# Patient Record
Sex: MALE | Race: WHITE | NOT HISPANIC OR LATINO | Employment: FULL TIME | URBAN - METROPOLITAN AREA
[De-identification: names, ages, dates, MRNs, and addresses within clinical notes are randomized per-mention and may not be internally consistent; named-entity substitution may affect disease eponyms.]

---

## 2023-08-24 PROBLEM — C61 MALIGNANT TUMOR OF PROSTATE (MULTI): Status: ACTIVE | Noted: 2023-08-24

## 2023-08-24 PROBLEM — N52.9 ERECTILE DYSFUNCTION: Status: ACTIVE | Noted: 2023-08-24

## 2023-08-24 PROBLEM — E78.00 PURE HYPERCHOLESTEROLEMIA: Status: ACTIVE | Noted: 2023-08-24

## 2023-08-24 PROBLEM — I10 HYPERTENSION: Status: ACTIVE | Noted: 2023-08-24

## 2023-08-24 PROBLEM — E11.9 TYPE 2 DIABETES MELLITUS (MULTI): Status: ACTIVE | Noted: 2023-08-24

## 2023-08-24 PROBLEM — R80.9 PROTEINURIA: Status: ACTIVE | Noted: 2023-08-24

## 2023-08-24 RX ORDER — ATORVASTATIN CALCIUM 40 MG/1
40 TABLET, FILM COATED ORAL DAILY
COMMUNITY
Start: 2013-03-18 | End: 2024-02-22 | Stop reason: SDUPTHER

## 2023-08-24 RX ORDER — LOSARTAN POTASSIUM 25 MG/1
25 TABLET ORAL DAILY
COMMUNITY
End: 2023-10-30 | Stop reason: WASHOUT

## 2023-08-24 RX ORDER — SEMAGLUTIDE 1.34 MG/ML
1 INJECTION, SOLUTION SUBCUTANEOUS
COMMUNITY
Start: 2023-03-16 | End: 2023-10-20 | Stop reason: SDUPTHER

## 2023-08-24 RX ORDER — GLIPIZIDE 10 MG/1
10 TABLET ORAL DAILY
COMMUNITY
End: 2023-12-04 | Stop reason: SDUPTHER

## 2023-08-24 RX ORDER — SEMAGLUTIDE 1.34 MG/ML
0.5 INJECTION, SOLUTION SUBCUTANEOUS
COMMUNITY
Start: 2022-12-09 | End: 2023-10-20 | Stop reason: SDUPTHER

## 2023-08-24 RX ORDER — METFORMIN HYDROCHLORIDE 1000 MG/1
1000 TABLET ORAL 2 TIMES DAILY
COMMUNITY
Start: 2023-04-20 | End: 2023-10-30

## 2023-08-24 RX ORDER — PIOGLITAZONEHYDROCHLORIDE 30 MG/1
30 TABLET ORAL DAILY
COMMUNITY
Start: 2013-05-17 | End: 2023-10-02

## 2023-08-24 RX ORDER — BLOOD SUGAR DIAGNOSTIC
STRIP MISCELLANEOUS
COMMUNITY
Start: 2023-03-16

## 2023-10-16 ENCOUNTER — LAB (OUTPATIENT)
Dept: LAB | Facility: LAB | Age: 63
End: 2023-10-16
Payer: COMMERCIAL

## 2023-10-16 DIAGNOSIS — C61 MALIGNANT NEOPLASM OF PROSTATE (MULTI): Primary | ICD-10-CM

## 2023-10-16 PROCEDURE — 84153 ASSAY OF PSA TOTAL: CPT

## 2023-10-16 PROCEDURE — 84402 ASSAY OF FREE TESTOSTERONE: CPT

## 2023-10-16 PROCEDURE — 36415 COLL VENOUS BLD VENIPUNCTURE: CPT

## 2023-10-16 PROCEDURE — 84154 ASSAY OF PSA FREE: CPT

## 2023-10-19 LAB
PSA FREE MFR SERPL: <50 %
PSA FREE SERPL-MCNC: <0.1 NG/ML
PSA SERPL IA-MCNC: 0.2 NG/ML (ref 0–4)

## 2023-10-20 ENCOUNTER — OFFICE VISIT (OUTPATIENT)
Dept: ENDOCRINOLOGY | Facility: CLINIC | Age: 63
End: 2023-10-20
Payer: COMMERCIAL

## 2023-10-20 ENCOUNTER — PHARMACY VISIT (OUTPATIENT)
Dept: PHARMACY | Facility: CLINIC | Age: 63
End: 2023-10-20
Payer: MEDICARE

## 2023-10-20 VITALS
DIASTOLIC BLOOD PRESSURE: 85 MMHG | WEIGHT: 222 LBS | HEART RATE: 67 BPM | BODY MASS INDEX: 29.69 KG/M2 | SYSTOLIC BLOOD PRESSURE: 134 MMHG

## 2023-10-20 DIAGNOSIS — I10 PRIMARY HYPERTENSION: ICD-10-CM

## 2023-10-20 DIAGNOSIS — E78.00 PURE HYPERCHOLESTEROLEMIA: ICD-10-CM

## 2023-10-20 DIAGNOSIS — E11.9 DIABETES MELLITUS, STABLE (MULTI): Primary | ICD-10-CM

## 2023-10-20 LAB
POC HEMOGLOBIN A1C: 7 % (ref 4.2–6.5)
TESTOSTERONE FREE (CHAN): 43 PG/ML (ref 35–155)
TESTOSTERONE,TOTAL,LC-MS/MS: 361 NG/DL (ref 250–1100)

## 2023-10-20 PROCEDURE — 83036 HEMOGLOBIN GLYCOSYLATED A1C: CPT | Performed by: NURSE PRACTITIONER

## 2023-10-20 PROCEDURE — RXMED WILLOW AMBULATORY MEDICATION CHARGE

## 2023-10-20 PROCEDURE — 3079F DIAST BP 80-89 MM HG: CPT | Performed by: NURSE PRACTITIONER

## 2023-10-20 PROCEDURE — 99213 OFFICE O/P EST LOW 20 MIN: CPT | Performed by: NURSE PRACTITIONER

## 2023-10-20 PROCEDURE — 3075F SYST BP GE 130 - 139MM HG: CPT | Performed by: NURSE PRACTITIONER

## 2023-10-20 PROCEDURE — 3051F HG A1C>EQUAL 7.0%<8.0%: CPT | Performed by: NURSE PRACTITIONER

## 2023-10-20 PROCEDURE — 4010F ACE/ARB THERAPY RXD/TAKEN: CPT | Performed by: NURSE PRACTITIONER

## 2023-10-20 RX ORDER — SEMAGLUTIDE 1.34 MG/ML
1 INJECTION, SOLUTION SUBCUTANEOUS
Qty: 3 ML | Refills: 11 | Status: SHIPPED | OUTPATIENT
Start: 2023-10-20 | End: 2024-09-20

## 2023-10-20 ASSESSMENT — PATIENT HEALTH QUESTIONNAIRE - PHQ9
SUM OF ALL RESPONSES TO PHQ9 QUESTIONS 1 & 2: 0
2. FEELING DOWN, DEPRESSED OR HOPELESS: NOT AT ALL
1. LITTLE INTEREST OR PLEASURE IN DOING THINGS: NOT AT ALL

## 2023-10-20 ASSESSMENT — LIFESTYLE VARIABLES: HOW OFTEN DO YOU HAVE A DRINK CONTAINING ALCOHOL: NEVER

## 2023-10-20 ASSESSMENT — PAIN SCALES - GENERAL: PAINLEVEL: 0-NO PAIN

## 2023-10-20 NOTE — PROGRESS NOTES
Chief Complaint   Patient presents with    Diabetes     Type 2  diabetes         HPI:    64 yo with DX DM Type 2 diagnosed age 53 yo. History HTN Dyslipidemia, Prostate cancer. seed implants 2020. Last A1c 7.7%, recently 7.2%. Patient testing sugars 1-2 times/day, no lows, am sugars waking 100, bedtimes can be <100. Following a carb controlled diet and knows reasonable carb allowances. Patient able to afford their medications. Patient is exercising, plays basketball weekly, 5K runs periodically           -Metformin Max dose, Ozempic 1 mg, hong 30mg, glipizide 10mg, no sglt2-I  -HTN Cozaar 25mg daily at goal  -STATIN Atorvastatin 40 mg daily LDL 89      Current Outpatient Medications:     atorvastatin (Lipitor) 40 mg tablet, Take 1 tablet (40 mg) by mouth once daily., Disp: , Rfl:     glipiZIDE (Glucotrol) 10 mg tablet, Take 1 tablet (10 mg) by mouth once daily., Disp: , Rfl:     losartan (Cozaar) 25 mg tablet, Take 1 tablet (25 mg) by mouth once daily., Disp: , Rfl:     metFORMIN (Glucophage) 1,000 mg tablet, Take 1 tablet (1,000 mg) by mouth 2 times a day., Disp: , Rfl:     OneTouch Ultra Test strip, , Disp: , Rfl:     Ozempic 1 mg/dose (4 mg/3 mL) pen injector, Inject 1 mg under the skin 1 (one) time per week., Disp: , Rfl:     pioglitazone (Actos) 30 mg tablet, TAKE 1 TABLET ONCE DAILY, Disp: 90 tablet, Rfl: 0    empagliflozin (Jardiance) 25 mg, Take 1 tablet (25 mg) by mouth once daily., Disp: , Rfl:     Ozempic 0.25 mg or 0.5 mg(2 mg/1.5 mL) pen injector, Inject 0.5 mg under the skin 1 (one) time per week., Disp: , Rfl:     semaglutide (OZEMPIC) 1 mg/dose (4 mg/3 mL) pen injector, INJECT 1MG UNDER THE SKIN WEEKLY, Disp: 9 mL, Rfl: 1    /85 (BP Location: Left arm, Patient Position: Sitting)   Pulse 67   Wt 101 kg (222 lb)   BMI 29.69 kg/m²      Past Medical History:   Diagnosis Date    Hyperlipidemia     Hypertension     Type 2 diabetes mellitus (CMS/HCC)         ROS:      CARDIOLOGY:           Lightheadedness denies.  Chest pain denies.  Leg edema denies.  Palpitations denies.         RESPIRATORY:          Cough denies.  Shortness of breath denies.  Wheezing denies.         GASTROENTEROLOGY:          Abdominal pain denies.  Constipation denies.  Diarrhea denies.  Heartburn denies.         ENDOCRINOLOGY:          Cold intolerance denies.  Excessive sweating denies.  Heat intolerance denies.  Tremulousness denies.         NEUROLOGY:          Burning pain in feet denies.  Burning pain in hands denies.         PSYCHOLOGY:          Low energy denies.  Irritability denies.  Sleep disturbances denies.           Examination:     General Examination:         GENERAL APPEARANCE:  Pleasant and cooperative,No Acute Distress.          NECK: no lymphadenopathy,no thyromegaly, no dominant thyroid nodules.          HEART: no murmurs, click or rubs, regular rate and rhythm, normal S1S2.          LUNGS: clear to auscultation bilaterally, no wheezes/rhonchi/rales.          EXTREMITIES: no edema, 2+ pulses               Chemistry    Lab Results   Component Value Date/Time     07/10/2023 1559    K 4.2 07/10/2023 1559     07/10/2023 1559    CO2 24 07/10/2023 1559    BUN 15 07/10/2023 1559    CREATININE 1.0 07/10/2023 1559    Lab Results   Component Value Date/Time    CALCIUM 9.0 07/10/2023 1559    ALKPHOS 49 07/10/2023 1559    AST 19 07/10/2023 1559    ALT 15 07/10/2023 1559    BILITOT 0.3 07/10/2023 1559          Lab Results   Component Value Date    WBC 7.5 03/23/2022    HGB 14.7 03/23/2022    HCT 43.8 03/23/2022    MCV 86.6 03/23/2022     03/23/2022     Lab Results   Component Value Date    HGBA1C 7.0 (A) 10/20/2023       1. Diabetes mellitus, stable (CMS/[facility])  -excellent reduction of A1c  -no changes in treatment plan    - POCT glycosylated hemoglobin (Hb A1C) manually resulted    2. Primary hypertension  -stable    3. Pure hypercholesterolemia  -LDL target < 70  -tolerates statin      Follow up CNP 4  months      -previous labs and notes reviewed  -labs/tests mentioned in above note reviewed and interpreted  -reviewed and counseled on medication monitoring and side effects.

## 2023-12-04 DIAGNOSIS — E11.9 TYPE 2 DIABETES MELLITUS WITHOUT COMPLICATION, WITHOUT LONG-TERM CURRENT USE OF INSULIN (MULTI): Primary | ICD-10-CM

## 2023-12-04 RX ORDER — GLIPIZIDE 10 MG/1
10 TABLET ORAL DAILY
Qty: 90 TABLET | Refills: 0 | Status: SHIPPED | OUTPATIENT
Start: 2023-12-04 | End: 2024-03-07 | Stop reason: SDUPTHER

## 2023-12-04 NOTE — TELEPHONE ENCOUNTER
Spouse requesting refill of Glipizide 10 mg to go to [facility]. LOV 7/11/2023. NOV 1/16/2024

## 2023-12-08 PROCEDURE — RXMED WILLOW AMBULATORY MEDICATION CHARGE

## 2023-12-20 DIAGNOSIS — E11.9 TYPE 2 DIABETES MELLITUS WITHOUT COMPLICATION, WITHOUT LONG-TERM CURRENT USE OF INSULIN (MULTI): ICD-10-CM

## 2023-12-20 RX ORDER — METFORMIN HYDROCHLORIDE 1000 MG/1
1000 TABLET ORAL
Qty: 180 TABLET | Refills: 0 | Status: SHIPPED | OUTPATIENT
Start: 2023-12-20 | End: 2024-03-29 | Stop reason: SDUPTHER

## 2023-12-20 NOTE — TELEPHONE ENCOUNTER
Switch to mail order per ins.  LV 7/11/23, NV 1/16/24.    Request for metformin 1000 mg to [facility].

## 2024-01-02 DIAGNOSIS — E11.9 TYPE 2 DIABETES MELLITUS WITHOUT COMPLICATION, WITHOUT LONG-TERM CURRENT USE OF INSULIN (MULTI): ICD-10-CM

## 2024-01-02 RX ORDER — PIOGLITAZONEHYDROCHLORIDE 30 MG/1
30 TABLET ORAL DAILY
Qty: 90 TABLET | Refills: 0 | Status: SHIPPED | OUTPATIENT
Start: 2024-01-02 | End: 2024-03-29 | Stop reason: SDUPTHER

## 2024-01-02 NOTE — TELEPHONE ENCOUNTER
LV 7/11/23, NV 1/16/24.  Request for pioglitazone 30 mg to [facility].

## 2024-01-04 ENCOUNTER — PHARMACY VISIT (OUTPATIENT)
Dept: PHARMACY | Facility: CLINIC | Age: 64
End: 2024-01-04
Payer: MEDICARE

## 2024-01-16 ENCOUNTER — OFFICE VISIT (OUTPATIENT)
Dept: PRIMARY CARE | Facility: CLINIC | Age: 64
End: 2024-01-16
Payer: COMMERCIAL

## 2024-01-16 VITALS
SYSTOLIC BLOOD PRESSURE: 130 MMHG | BODY MASS INDEX: 30.23 KG/M2 | HEART RATE: 80 BPM | OXYGEN SATURATION: 98 % | TEMPERATURE: 96.8 F | WEIGHT: 226 LBS | DIASTOLIC BLOOD PRESSURE: 68 MMHG

## 2024-01-16 DIAGNOSIS — I10 PRIMARY HYPERTENSION: Primary | ICD-10-CM

## 2024-01-16 DIAGNOSIS — E11.9 DIABETES MELLITUS, STABLE (MULTI): ICD-10-CM

## 2024-01-16 DIAGNOSIS — C61 MALIGNANT TUMOR OF PROSTATE (MULTI): ICD-10-CM

## 2024-01-16 DIAGNOSIS — E78.00 PURE HYPERCHOLESTEROLEMIA: ICD-10-CM

## 2024-01-16 PROCEDURE — 3075F SYST BP GE 130 - 139MM HG: CPT | Performed by: INTERNAL MEDICINE

## 2024-01-16 PROCEDURE — 3078F DIAST BP <80 MM HG: CPT | Performed by: INTERNAL MEDICINE

## 2024-01-16 PROCEDURE — 99396 PREV VISIT EST AGE 40-64: CPT | Performed by: INTERNAL MEDICINE

## 2024-01-16 PROCEDURE — 4010F ACE/ARB THERAPY RXD/TAKEN: CPT | Performed by: INTERNAL MEDICINE

## 2024-01-16 ASSESSMENT — PAIN SCALES - GENERAL: PAINLEVEL: 0-NO PAIN

## 2024-01-16 ASSESSMENT — ENCOUNTER SYMPTOMS
DEPRESSION: 0
OCCASIONAL FEELINGS OF UNSTEADINESS: 0
LOSS OF SENSATION IN FEET: 0

## 2024-01-16 ASSESSMENT — PATIENT HEALTH QUESTIONNAIRE - PHQ9
1. LITTLE INTEREST OR PLEASURE IN DOING THINGS: NOT AT ALL
SUM OF ALL RESPONSES TO PHQ9 QUESTIONS 1 AND 2: 0
2. FEELING DOWN, DEPRESSED OR HOPELESS: NOT AT ALL

## 2024-01-16 NOTE — PROGRESS NOTES
[facility]: [facility]  PROGRESS NOTE      Jasbir Raza is a 63 y.o. male that is being seen  today for Annual Exam.  Subjective   Pt. Is being seen for annual physical exam.Pt. has been doing well.Advance directives discussed with patient   Denies any hospitalisation or urgent care visit.  Previous Labs reviewed .  Pt. Is upto date on screening exams and vaccination  Denies any falls or hospitalisation.      patient is a 63-year-old male with history of diabetes, hypertension hyperlipidemia who is being seen for annual physical examination.  Patient follows up with endocrinologist and his blood sugars have been fairly controlled on current medications.  Patient's last hemoglobin A1c was 7.0.  Patient's previous labs done was reviewed and have been stable.  Patient denies any complaints with the medications.  Patient is up-to-date on immunizations.  Patient is up-to-date on colonoscopy.  Patient does follow-up with the urologist for prostate cancer and has been in remission.      ROS  Negative for fever or chills  Negative for sore throat, ear pain, nasal discharge  Negative for cough, shortness of breath or wheezing  Negative for chest pain, palpitations, swelling of legs  Negative for abdominal pain, constipation, diarrhea, blood in the stools  Negative for urinary complaints  Negative for headache, dizziness, weakness or numbness  Negative for joint pain  Negative for depression or anxiety  All other systems reviewed and were negative   Vitals:    01/16/24 1419   BP: 130/68   Pulse: 80   Temp: 36 °C (96.8 °F)   SpO2: 98%      Vitals:    01/16/24 1419   Weight: 103 kg (226 lb)     Body mass index is 30.23 kg/m².  Physical Exam  Constitutional: Patient does not appear to be in any acute distress  Head and Face: NCAT  Eyes: Normal external exam, EOMI  ENT: Normal external inspection of ears and nose. Oropharynx normal.  Cardiovascular: RRR, S1/S2, no murmurs, rubs, or gallops, radial pulses  +2, no edema of extremities  Pulmonary: CTAB, no respiratory distress.  Abdomen: +BS, soft, non-tender, nondistended, no guarding or rebound, no masses noted  MSK: No joint swelling, normal movements of all extremities. Range of motion- normal.  Skin- No lesions, contusions, or erythema.  Peripheral puslses palpable bilaterally 2+  Neuro: AAO X3, Cranial nerves 2-12 grossly intact,DTR 2+ in all 4 limbs   Psychiatric: Judgment intact. Appropriate mood and behavior    LABS   [unfilled]  Lab Results   Component Value Date    GLUCOSE 145 (H) 07/10/2023    CALCIUM 9.0 07/10/2023     07/10/2023    K 4.2 07/10/2023    CO2 24 07/10/2023     07/10/2023    BUN 15 07/10/2023    CREATININE 1.0 07/10/2023     Lab Results   Component Value Date    ALT 15 07/10/2023    AST 19 07/10/2023    ALKPHOS 49 07/10/2023    BILITOT 0.3 07/10/2023     Lab Results   Component Value Date    WBC 7.5 03/23/2022    HGB 14.7 03/23/2022    HCT 43.8 03/23/2022    MCV 86.6 03/23/2022     03/23/2022     Lab Results   Component Value Date    CHOL 155 03/03/2023    CHOL 162 03/23/2022    CHOL 167 11/22/2021     Lab Results   Component Value Date    HDL 47 03/03/2023    HDL 55 03/23/2022    HDL 53 11/22/2021     Lab Results   Component Value Date    LDLCALC 80 03/03/2023    LDLCALC 89 03/23/2022    LDLCALC 92 11/22/2021     Lab Results   Component Value Date    TRIG 139 03/03/2023    TRIG 91 03/23/2022    TRIG 110 11/22/2021     Lab Results   Component Value Date    HGBA1C 7.0 (A) 10/20/2023     Other labs not included in the list above were reviewed either before or during this encounter.    History    Past Medical History:   Diagnosis Date    ED (erectile dysfunction)     Hypercholesteremia     Hyperlipidemia     Hypertension     Prostate cancer (CMS/HCC)     Proteinuria     Type 2 diabetes mellitus (CMS/HCC)      Past Surgical History:   Procedure Laterality Date    COLONOSCOPY      PROSTATE SURGERY      prostate seeds     Family  History   Problem Relation Name Age of Onset    Stroke Father       No Known Allergies  Current Outpatient Medications on File Prior to Visit   Medication Sig Dispense Refill    atorvastatin (Lipitor) 40 mg tablet Take 1 tablet (40 mg) by mouth once daily.      glipiZIDE (Glucotrol) 10 mg tablet Take 1 tablet (10 mg) by mouth once daily. 90 tablet 0    losartan (Cozaar) 25 mg tablet Take 1 tablet (25 mg) by mouth once daily.      metFORMIN (Glucophage) 1,000 mg tablet Take 1 tablet (1,000 mg) by mouth 2 times a day with meals. 180 tablet 0    OneTouch Ultra Test strip       Ozempic 1 mg/dose (4 mg/3 mL) pen injector Inject 1 mg under the skin 1 (one) time per week. 3 mL 11    pioglitazone (Actos) 30 mg tablet Take 1 tablet (30 mg) by mouth once daily. 90 tablet 0     No current facility-administered medications on file prior to visit.     Immunization History   Administered Date(s) Administered    Flu vaccine, quadrivalent, high-dose, preservative free, age 65y+ (FLUZONE) 09/15/2023    Flu vaccine, quadrivalent, recombinant, preservative free, adult (FLUBLOK) 11/24/2021, 10/03/2022    Influenza, injectable, MDCK, quadrivalent 11/27/2020    Influenza, injectable, quadrivalent 10/12/2018, 11/01/2019    Moderna SARS-CoV-2 Vaccination 03/17/2021, 04/14/2021, 11/13/2021    Pfizer COVID-19 vaccine, Fall 2023, 12 years and older, (30mcg/0.3mL) 09/23/2023    Pneumococcal conjugate vaccine, 20-valent (PREVNAR 20) 09/15/2023     Patient's medical history was reviewed and updated either before or during this encounter.  ASSESSMENT / PLAN:  Diagnoses and all orders for this visit:  Primary hypertension  -     CBC; Future  -     Comprehensive Metabolic Panel; Future  Malignant tumor of prostate (CMS/HCC)  Diabetes mellitus, stable (CMS/HCC)  Pure hypercholesterolemia  -     Lipid Panel; Future      Patient's blood sugars have been fairly controlled.  Patient follows up with endocrinologist and no change in medication recently  patient has been on Ozempic, pioglitazone, metformin and glipizide  Patient is due for his cholesterol levels to be checked.  Patient follows up with his urologist for prostate cancer which has been in remission.  Last PSA level is  0.2      Sanjay Orellana MD

## 2024-01-20 PROCEDURE — RXMED WILLOW AMBULATORY MEDICATION CHARGE

## 2024-01-22 ENCOUNTER — LAB (OUTPATIENT)
Dept: LAB | Facility: LAB | Age: 64
End: 2024-01-22
Payer: COMMERCIAL

## 2024-01-22 DIAGNOSIS — E78.00 PURE HYPERCHOLESTEROLEMIA: ICD-10-CM

## 2024-01-22 DIAGNOSIS — I10 PRIMARY HYPERTENSION: ICD-10-CM

## 2024-01-22 LAB
ALBUMIN SERPL-MCNC: 4.4 G/DL (ref 3.5–5)
ALP BLD-CCNC: 55 U/L (ref 35–125)
ALT SERPL-CCNC: 11 U/L (ref 5–40)
ANION GAP SERPL CALC-SCNC: 10 MMOL/L
AST SERPL-CCNC: 12 U/L (ref 5–40)
BILIRUB SERPL-MCNC: 0.4 MG/DL (ref 0.1–1.2)
BUN SERPL-MCNC: 18 MG/DL (ref 8–25)
CALCIUM SERPL-MCNC: 9.5 MG/DL (ref 8.5–10.4)
CHLORIDE SERPL-SCNC: 100 MMOL/L (ref 97–107)
CHOLEST SERPL-MCNC: 157 MG/DL (ref 133–200)
CHOLEST/HDLC SERPL: 3.1 {RATIO}
CO2 SERPL-SCNC: 27 MMOL/L (ref 24–31)
CREAT SERPL-MCNC: 1 MG/DL (ref 0.4–1.6)
EGFRCR SERPLBLD CKD-EPI 2021: 86 ML/MIN/1.73M*2
ERYTHROCYTE [DISTWIDTH] IN BLOOD BY AUTOMATED COUNT: 14.1 % (ref 11.5–14.5)
GLUCOSE SERPL-MCNC: 166 MG/DL (ref 65–99)
HCT VFR BLD AUTO: 46.4 % (ref 41–52)
HDLC SERPL-MCNC: 51 MG/DL
HGB BLD-MCNC: 15.1 G/DL (ref 13.5–17.5)
LDLC SERPL CALC-MCNC: 87 MG/DL (ref 65–130)
MCH RBC QN AUTO: 29 PG (ref 26–34)
MCHC RBC AUTO-ENTMCNC: 32.5 G/DL (ref 32–36)
MCV RBC AUTO: 89 FL (ref 80–100)
NRBC BLD-RTO: 0 /100 WBCS (ref 0–0)
PLATELET # BLD AUTO: 201 X10*3/UL (ref 150–450)
POTASSIUM SERPL-SCNC: 4.4 MMOL/L (ref 3.4–5.1)
PROT SERPL-MCNC: 6.4 G/DL (ref 5.9–7.9)
RBC # BLD AUTO: 5.21 X10*6/UL (ref 4.5–5.9)
SODIUM SERPL-SCNC: 137 MMOL/L (ref 133–145)
TRIGL SERPL-MCNC: 93 MG/DL (ref 40–150)
WBC # BLD AUTO: 7.7 X10*3/UL (ref 4.4–11.3)

## 2024-01-22 PROCEDURE — 80061 LIPID PANEL: CPT

## 2024-01-22 PROCEDURE — 36415 COLL VENOUS BLD VENIPUNCTURE: CPT

## 2024-01-22 PROCEDURE — 80053 COMPREHEN METABOLIC PANEL: CPT

## 2024-01-22 PROCEDURE — 85027 COMPLETE CBC AUTOMATED: CPT

## 2024-01-23 ENCOUNTER — PHARMACY VISIT (OUTPATIENT)
Dept: PHARMACY | Facility: CLINIC | Age: 64
End: 2024-01-23
Payer: MEDICARE

## 2024-01-23 ENCOUNTER — TELEPHONE (OUTPATIENT)
Dept: PRIMARY CARE | Facility: CLINIC | Age: 64
End: 2024-01-23
Payer: COMMERCIAL

## 2024-01-23 NOTE — TELEPHONE ENCOUNTER
----- Message from Sanjay Orellana MD sent at 1/23/2024  1:42 PM EST -----  Labs are stable except elevated glucose.pt. is being seen by endocrinologist

## 2024-02-20 ENCOUNTER — OFFICE VISIT (OUTPATIENT)
Dept: ENDOCRINOLOGY | Facility: CLINIC | Age: 64
End: 2024-02-20
Payer: COMMERCIAL

## 2024-02-20 VITALS
SYSTOLIC BLOOD PRESSURE: 126 MMHG | BODY MASS INDEX: 30.1 KG/M2 | DIASTOLIC BLOOD PRESSURE: 66 MMHG | HEART RATE: 83 BPM | WEIGHT: 225 LBS

## 2024-02-20 DIAGNOSIS — E11.9 DIABETES MELLITUS, STABLE (MULTI): Primary | ICD-10-CM

## 2024-02-20 DIAGNOSIS — E78.00 PURE HYPERCHOLESTEROLEMIA: ICD-10-CM

## 2024-02-20 DIAGNOSIS — I10 PRIMARY HYPERTENSION: ICD-10-CM

## 2024-02-20 LAB — POC HEMOGLOBIN A1C: 7.1 % (ref 4.2–6.5)

## 2024-02-20 PROCEDURE — 3078F DIAST BP <80 MM HG: CPT | Performed by: NURSE PRACTITIONER

## 2024-02-20 PROCEDURE — 99213 OFFICE O/P EST LOW 20 MIN: CPT | Performed by: NURSE PRACTITIONER

## 2024-02-20 PROCEDURE — 83036 HEMOGLOBIN GLYCOSYLATED A1C: CPT | Performed by: NURSE PRACTITIONER

## 2024-02-20 PROCEDURE — 3048F LDL-C <100 MG/DL: CPT | Performed by: NURSE PRACTITIONER

## 2024-02-20 PROCEDURE — 3074F SYST BP LT 130 MM HG: CPT | Performed by: NURSE PRACTITIONER

## 2024-02-20 PROCEDURE — 4010F ACE/ARB THERAPY RXD/TAKEN: CPT | Performed by: NURSE PRACTITIONER

## 2024-02-20 ASSESSMENT — LIFESTYLE VARIABLES
HOW OFTEN DO YOU HAVE A DRINK CONTAINING ALCOHOL: 2-4 TIMES A MONTH
HOW MANY STANDARD DRINKS CONTAINING ALCOHOL DO YOU HAVE ON A TYPICAL DAY: 1 OR 2

## 2024-02-20 ASSESSMENT — PATIENT HEALTH QUESTIONNAIRE - PHQ9
1. LITTLE INTEREST OR PLEASURE IN DOING THINGS: NOT AT ALL
2. FEELING DOWN, DEPRESSED OR HOPELESS: NOT AT ALL
SUM OF ALL RESPONSES TO PHQ9 QUESTIONS 1 & 2: 0

## 2024-02-20 ASSESSMENT — PAIN SCALES - GENERAL: PAINLEVEL: 0-NO PAIN

## 2024-02-20 NOTE — PROGRESS NOTES
HPI     62 yo with DX DM Type 2 diagnosed age 53 yo. History HTN Dyslipidemia, Prostate cancer. seed implants 2020. A1c 7.1% was 7.2%. Patient testing sugars 1-2 times/day, no lows, am sugars waking 100, bedtimes can be <100. Following a carb controlled diet and knows reasonable carb allowances. Patient able to afford their medications. Patient is exercising, plays basketball weekly, 5K runs periodically           -Metformin Max dose, Ozempic 1 mg, hong 30mg, glipizide 10mg, no sglt2-I  -HTN Cozaar 25mg daily at goal  -STATIN Atorvastatin 40 mg daily LDLCALC 87      Current Outpatient Medications:     atorvastatin (Lipitor) 40 mg tablet, Take 1 tablet (40 mg) by mouth once daily., Disp: , Rfl:     glipiZIDE (Glucotrol) 10 mg tablet, Take 1 tablet (10 mg) by mouth once daily., Disp: 90 tablet, Rfl: 0    losartan (Cozaar) 25 mg tablet, Take 1 tablet (25 mg) by mouth once daily., Disp: , Rfl:     metFORMIN (Glucophage) 1,000 mg tablet, Take 1 tablet (1,000 mg) by mouth 2 times a day with meals., Disp: 180 tablet, Rfl: 0    OneTouch Ultra Test strip, , Disp: , Rfl:     Ozempic 1 mg/dose (4 mg/3 mL) pen injector, Inject 1 mg under the skin 1 (one) time per week., Disp: 3 mL, Rfl: 11    pioglitazone (Actos) 30 mg tablet, Take 1 tablet (30 mg) by mouth once daily., Disp: 90 tablet, Rfl: 0      Allergies as of 02/20/2024    (No Known Allergies)         Review of Systems   Cardiology: Lightheadedness-denies.  Chest pain-denies.  Leg edema-denies.  Palpitations-denies.  Respiratory: Cough-denies. Shortness of breath-denies.  Wheezing-denies.  Gastroenterology: Constipation-denies.  Diarrhea-denies.  Heartburn-denies.  Endocrinology: Cold intolerance-denies.  Heat intolerance-denies.  Sweats-denies.  Neurology: Headache-denies.  Tremor-denies.  Neuropathy in extremities-denies.  Psychology: Low energy-denies.  Irritability-denies.  Sleep disturbances-denies.      /66 (BP Location: Left arm, Patient Position: Sitting)    Pulse 83   Wt 102 kg (225 lb)   BMI 30.10 kg/m²       Labs:  Lab Results   Component Value Date    WBC 7.7 01/22/2024    NRBC 0.0 01/22/2024    RBC 5.21 01/22/2024    HGB 15.1 01/22/2024    HCT 46.4 01/22/2024     01/22/2024     Lab Results   Component Value Date    CALCIUM 9.5 01/22/2024    AST 12 01/22/2024    ALKPHOS 55 01/22/2024    BILITOT 0.4 01/22/2024    PROT 6.4 01/22/2024    ALBUMIN 4.4 01/22/2024    GLOB 2.0 07/10/2023    AGR 2.3 07/10/2023     01/22/2024    K 4.4 01/22/2024     01/22/2024    CO2 27 01/22/2024    ANIONGAP 10 01/22/2024    BUN 18 01/22/2024    CREATININE 1.00 01/22/2024    UREACREAUR 15.0 07/10/2023    GLUCOSE 166 (H) 01/22/2024    ALT 11 01/22/2024    EGFR 86 01/22/2024     Lab Results   Component Value Date    CHOL 157 01/22/2024    TRIG 93 01/22/2024    HDL 51.0 01/22/2024    LDLCALC 87 01/22/2024       Lab Results   Component Value Date    HGBA1C 7.1 (A) 02/20/2024         Physical Exam   General Appearance: pleasant, cooperative, no acute distress  HEENT: no chemosis, no proptosis, no lid lag, no lid retraction  Neck: no lymphadenopathy, no thyromegaly, no dominant thyroid nodules  Heart: no murmurs, regular rate and rhythm, S1 and S2  Lungs: no wheezes, no rhonci, no rales  Extremities: no lower extremity swelling      Assessment/Plan     1. Diabetes mellitus, stable (CMS/HCC)  -excellent A1c control  -no changes in treatment plan  -consider increasing GLP1 for weight     - POCT glycosylated hemoglobin (Hb A1C) manually resulted    2. Primary hypertension  -stable    3. Pure hypercholesterolemia  -at target     Follow Up: 4 months CNP      -labs/tests/notes reviewed  -reviewed and counseled patient on medication monitoring and side effects

## 2024-02-22 DIAGNOSIS — E78.00 PURE HYPERCHOLESTEROLEMIA: Primary | ICD-10-CM

## 2024-02-22 RX ORDER — ATORVASTATIN CALCIUM 40 MG/1
40 TABLET, FILM COATED ORAL DAILY
Qty: 90 TABLET | Refills: 1 | Status: SHIPPED | OUTPATIENT
Start: 2024-02-22

## 2024-02-22 NOTE — TELEPHONE ENCOUNTER
Refill     [facility]     Atorvastatin 40 mg     LV 01/16/24 NV 7/16/2024

## 2024-02-28 PROCEDURE — RXMED WILLOW AMBULATORY MEDICATION CHARGE

## 2024-02-29 ENCOUNTER — PHARMACY VISIT (OUTPATIENT)
Dept: PHARMACY | Facility: CLINIC | Age: 64
End: 2024-02-29
Payer: MEDICARE

## 2024-03-07 DIAGNOSIS — E11.9 TYPE 2 DIABETES MELLITUS WITHOUT COMPLICATION, WITHOUT LONG-TERM CURRENT USE OF INSULIN (MULTI): ICD-10-CM

## 2024-03-07 RX ORDER — GLIPIZIDE 10 MG/1
10 TABLET ORAL DAILY
Qty: 90 TABLET | Refills: 1 | Status: SHIPPED | OUTPATIENT
Start: 2024-03-07

## 2024-03-22 PROCEDURE — RXMED WILLOW AMBULATORY MEDICATION CHARGE

## 2024-03-26 ENCOUNTER — PHARMACY VISIT (OUTPATIENT)
Dept: PHARMACY | Facility: CLINIC | Age: 64
End: 2024-03-26
Payer: MEDICARE

## 2024-03-29 DIAGNOSIS — E11.9 TYPE 2 DIABETES MELLITUS WITHOUT COMPLICATION, WITHOUT LONG-TERM CURRENT USE OF INSULIN (MULTI): ICD-10-CM

## 2024-03-29 RX ORDER — PIOGLITAZONEHYDROCHLORIDE 30 MG/1
30 TABLET ORAL DAILY
Qty: 90 TABLET | Refills: 1 | Status: SHIPPED | OUTPATIENT
Start: 2024-03-29

## 2024-03-29 RX ORDER — METFORMIN HYDROCHLORIDE 1000 MG/1
1000 TABLET ORAL
Qty: 180 TABLET | Refills: 1 | Status: SHIPPED | OUTPATIENT
Start: 2024-03-29

## 2024-03-29 NOTE — TELEPHONE ENCOUNTER
Refill    [facility]     Metformin 1,000 mg  Pioglitazone 30 mg     LV 01/16/24 NV 7/16/2024  '

## 2024-04-12 ENCOUNTER — LAB (OUTPATIENT)
Dept: LAB | Facility: LAB | Age: 64
End: 2024-04-12
Payer: COMMERCIAL

## 2024-04-12 DIAGNOSIS — N18.4 CHRONIC KIDNEY DISEASE, STAGE 4 (SEVERE) (MULTI): ICD-10-CM

## 2024-04-12 DIAGNOSIS — C61 MALIGNANT NEOPLASM OF PROSTATE (MULTI): Primary | ICD-10-CM

## 2024-04-12 LAB — PSA SERPL-MCNC: 0.1 NG/ML

## 2024-04-12 PROCEDURE — 36415 COLL VENOUS BLD VENIPUNCTURE: CPT

## 2024-04-12 PROCEDURE — 84153 ASSAY OF PSA TOTAL: CPT

## 2024-04-19 PROCEDURE — RXMED WILLOW AMBULATORY MEDICATION CHARGE

## 2024-04-24 ENCOUNTER — PHARMACY VISIT (OUTPATIENT)
Dept: PHARMACY | Facility: CLINIC | Age: 64
End: 2024-04-24
Payer: MEDICARE

## 2024-05-17 PROCEDURE — RXMED WILLOW AMBULATORY MEDICATION CHARGE

## 2024-05-21 ENCOUNTER — PHARMACY VISIT (OUTPATIENT)
Dept: PHARMACY | Facility: CLINIC | Age: 64
End: 2024-05-21
Payer: MEDICARE

## 2024-06-18 PROCEDURE — RXMED WILLOW AMBULATORY MEDICATION CHARGE

## 2024-06-19 ENCOUNTER — PHARMACY VISIT (OUTPATIENT)
Dept: PHARMACY | Facility: CLINIC | Age: 64
End: 2024-06-19
Payer: MEDICARE

## 2024-06-21 ENCOUNTER — APPOINTMENT (OUTPATIENT)
Dept: ENDOCRINOLOGY | Facility: CLINIC | Age: 64
End: 2024-06-21
Payer: COMMERCIAL

## 2024-06-21 VITALS
HEART RATE: 83 BPM | SYSTOLIC BLOOD PRESSURE: 124 MMHG | DIASTOLIC BLOOD PRESSURE: 68 MMHG | HEIGHT: 73 IN | BODY MASS INDEX: 29.42 KG/M2 | WEIGHT: 222 LBS

## 2024-06-21 DIAGNOSIS — E78.00 PURE HYPERCHOLESTEROLEMIA: ICD-10-CM

## 2024-06-21 DIAGNOSIS — I10 PRIMARY HYPERTENSION: ICD-10-CM

## 2024-06-21 DIAGNOSIS — E11.9 DIABETES MELLITUS, STABLE (MULTI): Primary | ICD-10-CM

## 2024-06-21 LAB — POC HEMOGLOBIN A1C: 7 % (ref 4.2–6.5)

## 2024-06-21 PROCEDURE — 99213 OFFICE O/P EST LOW 20 MIN: CPT | Performed by: NURSE PRACTITIONER

## 2024-06-21 PROCEDURE — 3048F LDL-C <100 MG/DL: CPT | Performed by: NURSE PRACTITIONER

## 2024-06-21 PROCEDURE — 83036 HEMOGLOBIN GLYCOSYLATED A1C: CPT | Performed by: NURSE PRACTITIONER

## 2024-06-21 PROCEDURE — 4010F ACE/ARB THERAPY RXD/TAKEN: CPT | Performed by: NURSE PRACTITIONER

## 2024-06-21 PROCEDURE — 3074F SYST BP LT 130 MM HG: CPT | Performed by: NURSE PRACTITIONER

## 2024-06-21 PROCEDURE — 3078F DIAST BP <80 MM HG: CPT | Performed by: NURSE PRACTITIONER

## 2024-06-21 ASSESSMENT — ENCOUNTER SYMPTOMS
OCCASIONAL FEELINGS OF UNSTEADINESS: 0
LOSS OF SENSATION IN FEET: 0
DEPRESSION: 0

## 2024-06-21 ASSESSMENT — LIFESTYLE VARIABLES
SKIP TO QUESTIONS 9-10: 1
HOW MANY STANDARD DRINKS CONTAINING ALCOHOL DO YOU HAVE ON A TYPICAL DAY: PATIENT DOES NOT DRINK
AUDIT-C TOTAL SCORE: 0
HOW OFTEN DO YOU HAVE A DRINK CONTAINING ALCOHOL: NEVER
HOW OFTEN DO YOU HAVE SIX OR MORE DRINKS ON ONE OCCASION: NEVER

## 2024-06-21 ASSESSMENT — PAIN SCALES - GENERAL: PAINLEVEL: 0-NO PAIN

## 2024-06-21 ASSESSMENT — PATIENT HEALTH QUESTIONNAIRE - PHQ9
2. FEELING DOWN, DEPRESSED OR HOPELESS: NOT AT ALL
1. LITTLE INTEREST OR PLEASURE IN DOING THINGS: NOT AT ALL
SUM OF ALL RESPONSES TO PHQ9 QUESTIONS 1 & 2: 0

## 2024-06-21 NOTE — PROGRESS NOTES
"HPI     65 yo with DX DM Type 2 diagnosed age 53 yo. History HTN Dyslipidemia, Prostate cancer. seed implants 2020. A1c 7.0% was 7.1%. Patient testing sugars 1-2 times/day, no lows, am sugars waking 100, bedtimes can be <100. Following a carb controlled diet and knows reasonable carb allowances. Patient able to afford their medications. Patient is exercising, plays basketball weekly, 5K runs periodically           -Metformin Max dose, Ozempic 1 mg, hong 30mg, glipizide 10mg, no sglt2-I  -HTN Cozaar 25mg daily at goal  -STATIN Atorvastatin 40 mg daily LDLCALC 87      Current Outpatient Medications:     atorvastatin (Lipitor) 40 mg tablet, Take 1 tablet (40 mg) by mouth once daily., Disp: 90 tablet, Rfl: 1    glipiZIDE (Glucotrol) 10 mg tablet, Take 1 tablet (10 mg) by mouth once daily., Disp: 90 tablet, Rfl: 1    losartan (Cozaar) 25 mg tablet, Take 1 tablet (25 mg) by mouth once daily., Disp: , Rfl:     metFORMIN (Glucophage) 1,000 mg tablet, Take 1 tablet (1,000 mg) by mouth 2 times a day with meals., Disp: 180 tablet, Rfl: 1    OneTouch Ultra Test strip, , Disp: , Rfl:     Ozempic 1 mg/dose (4 mg/3 mL) pen injector, Inject 1 mg under the skin 1 (one) time per week., Disp: 3 mL, Rfl: 11    pioglitazone (Actos) 30 mg tablet, Take 1 tablet (30 mg) by mouth once daily., Disp: 90 tablet, Rfl: 1      Allergies as of 06/21/2024    (No Known Allergies)         Review of Systems   Cardiology: Lightheadedness-denies.  Chest pain-denies.  Leg edema-denies.  Palpitations-denies.  Respiratory: Cough-denies. Shortness of breath-denies.  Wheezing-denies.  Gastroenterology: Constipation-denies.  Diarrhea-denies.  Heartburn-denies.  Endocrinology: Cold intolerance-denies.  Heat intolerance-denies.  Sweats-denies.  Neurology: Headache-denies.  Tremor-denies.  Neuropathy in extremities-denies.  Psychology: Low energy-denies.  Irritability-denies.  Sleep disturbances-denies.      /68   Pulse 83   Ht 1.854 m (6' 1\")   Wt 101 " kg (222 lb)   BMI 29.29 kg/m²       Labs:  Lab Results   Component Value Date    WBC 7.7 01/22/2024    NRBC 0.0 01/22/2024    RBC 5.21 01/22/2024    HGB 15.1 01/22/2024    HCT 46.4 01/22/2024     01/22/2024     Lab Results   Component Value Date    CALCIUM 9.5 01/22/2024    AST 12 01/22/2024    ALKPHOS 55 01/22/2024    BILITOT 0.4 01/22/2024    PROT 6.4 01/22/2024    ALBUMIN 4.4 01/22/2024    GLOB 2.0 07/10/2023    AGR 2.3 07/10/2023     01/22/2024    K 4.4 01/22/2024     01/22/2024    CO2 27 01/22/2024    ANIONGAP 10 01/22/2024    BUN 18 01/22/2024    CREATININE 1.00 01/22/2024    UREACREAUR 15.0 07/10/2023    GLUCOSE 166 (H) 01/22/2024    ALT 11 01/22/2024    EGFR 86 01/22/2024     Lab Results   Component Value Date    CHOL 157 01/22/2024    TRIG 93 01/22/2024    HDL 51.0 01/22/2024    LDLCALC 87 01/22/2024       Lab Results   Component Value Date    HGBA1C 7.0 (A) 06/21/2024         Physical Exam   General Appearance: pleasant, cooperative, no acute distress  HEENT: no chemosis, no proptosis, no lid lag, no lid retraction  Neck: no lymphadenopathy, no thyromegaly, no dominant thyroid nodules  Heart: no murmurs, regular rate and rhythm, S1 and S2  Lungs: no wheezes, no rhonci, no rales  Extremities: no lower extremity swelling      Assessment/Plan     1. Diabetes mellitus, stable (CMS/HCC)  -excellent A1c control  -no changes in treatment plan  -consider increasing GLP1 for weight at follow up    - POCT glycosylated hemoglobin (Hb A1C) manually resulted    2. Primary hypertension  -stable    3. Pure hypercholesterolemia  -at target     Follow Up: 6 months CNP      -labs/tests/notes reviewed  -reviewed and counseled patient on medication monitoring and side effects

## 2024-07-13 ENCOUNTER — APPOINTMENT (OUTPATIENT)
Dept: LAB | Facility: LAB | Age: 64
End: 2024-07-13
Payer: COMMERCIAL

## 2024-07-13 PROCEDURE — RXMED WILLOW AMBULATORY MEDICATION CHARGE

## 2024-07-16 ENCOUNTER — LAB (OUTPATIENT)
Dept: LAB | Facility: LAB | Age: 64
End: 2024-07-16
Payer: COMMERCIAL

## 2024-07-16 ENCOUNTER — OFFICE VISIT (OUTPATIENT)
Dept: PRIMARY CARE | Facility: CLINIC | Age: 64
End: 2024-07-16
Payer: COMMERCIAL

## 2024-07-16 VITALS
WEIGHT: 221 LBS | HEART RATE: 75 BPM | BODY MASS INDEX: 29.29 KG/M2 | HEIGHT: 73 IN | OXYGEN SATURATION: 98 % | TEMPERATURE: 97 F | DIASTOLIC BLOOD PRESSURE: 60 MMHG | SYSTOLIC BLOOD PRESSURE: 120 MMHG

## 2024-07-16 DIAGNOSIS — E11.9 TYPE 2 DIABETES MELLITUS WITHOUT COMPLICATION, WITH LONG-TERM CURRENT USE OF INSULIN (MULTI): ICD-10-CM

## 2024-07-16 DIAGNOSIS — R80.9 PROTEINURIA, UNSPECIFIED TYPE: ICD-10-CM

## 2024-07-16 DIAGNOSIS — E11.9 DIABETES MELLITUS, STABLE (MULTI): ICD-10-CM

## 2024-07-16 DIAGNOSIS — C61 MALIGNANT TUMOR OF PROSTATE (MULTI): ICD-10-CM

## 2024-07-16 DIAGNOSIS — Z79.4 TYPE 2 DIABETES MELLITUS WITHOUT COMPLICATION, WITH LONG-TERM CURRENT USE OF INSULIN (MULTI): ICD-10-CM

## 2024-07-16 DIAGNOSIS — E78.00 PURE HYPERCHOLESTEROLEMIA: ICD-10-CM

## 2024-07-16 DIAGNOSIS — I10 PRIMARY HYPERTENSION: Primary | ICD-10-CM

## 2024-07-16 LAB
ALBUMIN SERPL-MCNC: 4.4 G/DL (ref 3.5–5)
ALP BLD-CCNC: 55 U/L (ref 35–125)
ALT SERPL-CCNC: 13 U/L (ref 5–40)
ANION GAP SERPL CALC-SCNC: 13 MMOL/L
AST SERPL-CCNC: 14 U/L (ref 5–40)
BILIRUB SERPL-MCNC: 0.3 MG/DL (ref 0.1–1.2)
BUN SERPL-MCNC: 12 MG/DL (ref 8–25)
CALCIUM SERPL-MCNC: 9.1 MG/DL (ref 8.5–10.4)
CHLORIDE SERPL-SCNC: 102 MMOL/L (ref 97–107)
CO2 SERPL-SCNC: 25 MMOL/L (ref 24–31)
CREAT SERPL-MCNC: 0.9 MG/DL (ref 0.4–1.6)
CREAT UR-MCNC: 288.2 MG/DL
EGFRCR SERPLBLD CKD-EPI 2021: >90 ML/MIN/1.73M*2
GLUCOSE SERPL-MCNC: 164 MG/DL (ref 65–99)
MICROALBUMIN UR-MCNC: 25 MG/L (ref 0–23)
MICROALBUMIN/CREAT UR: 8.7 UG/MG CREAT
POTASSIUM SERPL-SCNC: 4.4 MMOL/L (ref 3.4–5.1)
PROT SERPL-MCNC: 6.5 G/DL (ref 5.9–7.9)
SODIUM SERPL-SCNC: 140 MMOL/L (ref 133–145)

## 2024-07-16 PROCEDURE — 3008F BODY MASS INDEX DOCD: CPT | Performed by: INTERNAL MEDICINE

## 2024-07-16 PROCEDURE — 80053 COMPREHEN METABOLIC PANEL: CPT

## 2024-07-16 PROCEDURE — 90715 TDAP VACCINE 7 YRS/> IM: CPT | Performed by: INTERNAL MEDICINE

## 2024-07-16 PROCEDURE — 3048F LDL-C <100 MG/DL: CPT | Performed by: INTERNAL MEDICINE

## 2024-07-16 PROCEDURE — 82570 ASSAY OF URINE CREATININE: CPT

## 2024-07-16 PROCEDURE — 3078F DIAST BP <80 MM HG: CPT | Performed by: INTERNAL MEDICINE

## 2024-07-16 PROCEDURE — 82043 UR ALBUMIN QUANTITATIVE: CPT

## 2024-07-16 PROCEDURE — 4010F ACE/ARB THERAPY RXD/TAKEN: CPT | Performed by: INTERNAL MEDICINE

## 2024-07-16 PROCEDURE — 90471 IMMUNIZATION ADMIN: CPT | Performed by: INTERNAL MEDICINE

## 2024-07-16 PROCEDURE — 3074F SYST BP LT 130 MM HG: CPT | Performed by: INTERNAL MEDICINE

## 2024-07-16 PROCEDURE — 3061F NEG MICROALBUMINURIA REV: CPT | Performed by: INTERNAL MEDICINE

## 2024-07-16 PROCEDURE — 36415 COLL VENOUS BLD VENIPUNCTURE: CPT

## 2024-07-16 PROCEDURE — 99214 OFFICE O/P EST MOD 30 MIN: CPT | Performed by: INTERNAL MEDICINE

## 2024-07-16 ASSESSMENT — PATIENT HEALTH QUESTIONNAIRE - PHQ9
1. LITTLE INTEREST OR PLEASURE IN DOING THINGS: NOT AT ALL
2. FEELING DOWN, DEPRESSED OR HOPELESS: NOT AT ALL
SUM OF ALL RESPONSES TO PHQ9 QUESTIONS 1 AND 2: 0

## 2024-07-16 ASSESSMENT — PAIN SCALES - GENERAL: PAINLEVEL: 0-NO PAIN

## 2024-07-16 ASSESSMENT — ENCOUNTER SYMPTOMS
LOSS OF SENSATION IN FEET: 0
DEPRESSION: 0
OCCASIONAL FEELINGS OF UNSTEADINESS: 0

## 2024-07-16 NOTE — PROGRESS NOTES
[facility]: [facility]  PROGRESS NOTE      Jasbir Raza is a 64 y.o. male that is being seen  today for Follow-up (6 mos fu DM).  Subjective   Patient is a 64-year-old male with history of diabetes, hypertension and hyperlipidemia who is being seen for 6-month follow-up.  Patient has been doing well with the current medications.  Recent hemoglobin A1c was 7.0.  Patient follows up with endocrinologist.  Patient denies any significant complaint with the medications.  Patient has some rash over his legs.  Patient stated that it usually happens after he cuts grass.  Denies any fever or chills.  Denies any rash anywhere else in the body.      ROS  Negative for fever or chills  Negative for sore throat, ear pain, nasal discharge  Negative for cough, shortness of breath or wheezing  Negative for chest pain, palpitations, swelling of legs  Negative for abdominal pain, constipation, diarrhea, blood in the stools  Negative for urinary complaints  Negative for headache, dizziness, weakness or numbness  Negative for joint pain  Negative for depression or anxiety  All other systems reviewed and were negative   Vitals:    07/16/24 1359   BP: 120/60   Pulse: 75   Temp: 36.1 °C (97 °F)   SpO2: 98%      Vitals:    07/16/24 1359   Weight: 100 kg (221 lb)     Body mass index is 29.16 kg/m².  Physical Exam  Constitutional: Patient does not appear to be in any acute distress  Head and Face: NCAT  Eyes: Normal external exam, EOMI  ENT: Normal external inspection of ears and nose. Oropharynx normal.  Cardiovascular: RRR, S1/S2, no murmurs, rubs, or gallops, radial pulses +2, no edema of extremities  Pulmonary: CTAB, no respiratory distress.  Abdomen: +BS, soft, non-tender, nondistended, no guarding or rebound, no masses noted  MSK: No joint swelling, normal movements of all extremities. Range of motion- normal.  Skin-scratch marks present over lower legs and feet.  Peripheral puslses palpable bilaterally  2+  Neuro: AAO X3, Cranial nerves 2-12 grossly intact,DTR 2+ in all 4 limbs   Psychiatric: Judgment intact. Appropriate mood and behavior    LABS   [unfilled]  Lab Results   Component Value Date    GLUCOSE 166 (H) 01/22/2024    CALCIUM 9.5 01/22/2024     01/22/2024    K 4.4 01/22/2024    CO2 27 01/22/2024     01/22/2024    BUN 18 01/22/2024    CREATININE 1.00 01/22/2024     Lab Results   Component Value Date    ALT 11 01/22/2024    AST 12 01/22/2024    ALKPHOS 55 01/22/2024    BILITOT 0.4 01/22/2024     Lab Results   Component Value Date    WBC 7.7 01/22/2024    HGB 15.1 01/22/2024    HCT 46.4 01/22/2024    MCV 89 01/22/2024     01/22/2024     Lab Results   Component Value Date    CHOL 157 01/22/2024    CHOL 155 03/03/2023    CHOL 162 03/23/2022     Lab Results   Component Value Date    HDL 51.0 01/22/2024    HDL 47 03/03/2023    HDL 55 03/23/2022     Lab Results   Component Value Date    LDLCALC 87 01/22/2024    LDLCALC 80 03/03/2023    LDLCALC 89 03/23/2022     Lab Results   Component Value Date    TRIG 93 01/22/2024    TRIG 139 03/03/2023    TRIG 91 03/23/2022     Lab Results   Component Value Date    HGBA1C 7.0 (A) 06/21/2024     Other labs not included in the list above were reviewed either before or during this encounter.    History    Past Medical History:   Diagnosis Date    ED (erectile dysfunction)     Hypercholesteremia     Hyperlipidemia     Hypertension     Prostate cancer (Multi)     Proteinuria     Type 2 diabetes mellitus (Multi)      Past Surgical History:   Procedure Laterality Date    COLONOSCOPY      PROSTATE SURGERY      prostate seeds     Family History   Problem Relation Name Age of Onset    Stroke Father       No Known Allergies  Current Outpatient Medications on File Prior to Visit   Medication Sig Dispense Refill    atorvastatin (Lipitor) 40 mg tablet Take 1 tablet (40 mg) by mouth once daily. 90 tablet 1    glipiZIDE (Glucotrol) 10 mg tablet Take 1 tablet (10 mg) by  mouth once daily. 90 tablet 1    losartan (Cozaar) 25 mg tablet Take 1 tablet (25 mg) by mouth once daily.      metFORMIN (Glucophage) 1,000 mg tablet Take 1 tablet (1,000 mg) by mouth 2 times a day with meals. 180 tablet 1    OneTouch Ultra Test strip       Ozempic 1 mg/dose (4 mg/3 mL) pen injector Inject 1 mg under the skin 1 (one) time per week. 3 mL 11    pioglitazone (Actos) 30 mg tablet Take 1 tablet (30 mg) by mouth once daily. 90 tablet 1     No current facility-administered medications on file prior to visit.     Immunization History   Administered Date(s) Administered    Flu vaccine, quadrivalent, high-dose, preservative free, age 65y+ (FLUZONE) 09/15/2023    Flu vaccine, quadrivalent, recombinant, preservative free, adult (FLUBLOK) 11/24/2021, 10/03/2022    Influenza, injectable, MDCK, quadrivalent 11/27/2020    Influenza, injectable, quadrivalent 10/12/2018, 11/01/2019    Moderna SARS-CoV-2 Vaccination 03/17/2021, 04/14/2021, 11/13/2021    Pfizer COVID-19 vaccine, Fall 2023, 12 years and older, (30mcg/0.3mL) 09/23/2023    Pneumococcal conjugate vaccine, 20-valent (PREVNAR 20) 09/15/2023     Patient's medical history was reviewed and updated either before or during this encounter.  ASSESSMENT / PLAN:  Diagnoses and all orders for this visit:  Primary hypertension  -     CBC; Future  -     Comprehensive Metabolic Panel; Future  Pure hypercholesterolemia  -     Lipid Panel; Future  Type 2 diabetes mellitus without complication, with long-term current use of insulin (Multi)  -     Comprehensive Metabolic Panel; Future  -     Hemoglobin A1C; Future  Proteinuria, unspecified type  Malignant tumor of prostate (Multi)  Other orders  -     Tdap vaccine, age 7 years and older  (BOOSTRIX)    Patient being seen for 6-month follow-up.  Blood work reviewed.  Blood sugars have been stable.  Will continue with current medications.  Patient is due for Tdap.      Sanjay Orellana MD    80 y/o female BIBEMS c/o SOB. Per EMS report "Pt coming from home, lives alone but has 2 aides at home, family called for SOB, pt placed on CPAP en route, became claustrophobic with the mask on, 2 breathing treatments at home, 125 solumedrol and 2 breathing treatments on the way in to [facility], Hx COPD, supposed to be wearing at home but wasn't wearing it upon arrival oxygen saturation 88 on RA, lung sounds wheezy B/L". Pt arrives to [facility] SOB, audible expiratory wheezes, 20 G IV in L. hand placed by EMS pt became anxious and attempted to stand up and get out of bed due to difficulty breathing "I need to stand up", pt educated on importance of remaining in bed and risk of attempting to get out of bed, pt placed on BIPAP mask w/ RT, RN, MD at bedside PMHx COPD. Pt denies fever/chills, HA, abd pain, N/V/D, lightheadedness/dizziness, numbness/tingling. 80 y/o female BIBEMS c/o SOB. Per EMS report "Pt coming from home, lives alone but has 2 aides at home, family called for SOB, pt placed on CPAP en route, became claustrophobic with the mask on, 2 breathing treatments at home, 125 solumedrol and 2 breathing treatments on the way in to [facility], Hx COPD, supposed to be wearing at home but wasn't wearing it upon arrival oxygen saturation 88 on RA, lung sounds wheezy B/L". Pt arrives to [facility] SOB, audible expiratory wheezes, B/L lower leg edema present upon arrival which EMS states "Family reports this is bass 20 G IV in L. hand placed by EMS pt became anxious and attempted to stand up and get out of bed due to difficulty breathing "I need to stand up", pt educated on importance of remaining in bed and risk of attempting to get out of bed, pt placed on BIPAP mask w/ RT, RN, MD at bedside, 20 G IV placed by ER RN. PMHx COPD. Pt denies fever/chills, HA, abd pain, N/V/D, lightheadedness/dizziness, numbness/tingling. Pt A&Ox3 abd soft, nondistended/nontender, IV locked and patent, pt instructed on use of call bell, bed locked and in lowest position. 80 y/o female BIBEMS c/o SOB. Per EMS report "Pt coming from home, lives alone but has 2 aides at home, family called for SOB, pt placed on CPAP en route, became claustrophobic with the mask on, 2 breathing treatments at home, 125 solumedrol and 2 breathing treatments on the way in to [facility], Hx COPD, supposed to be wearing at home but wasn't wearing it upon arrival oxygen saturation 88 on RA, lung sounds wheezy B/L". Pt arrives to [facility] SOB, audible expiratory wheezes, B/L lower leg edema present upon arrival which EMS states "Family reports this is bass 20 G IV in L. hand placed by EMS pt became anxious and attempted to stand up and get out of bed due to difficulty breathing "I need to stand up", pt educated on importance of remaining in bed and risk of attempting to get out of bed, pt placed on BIPAP mask w/ RT, RN, MD at bedside. Pt placed on continuous cardiac monitoring and continuous pulse oximetry due to tachycardia and tachypnea/SOB, 20 G IV placed by ER RN. PMHx COPD. Pt denies fever/chills, HA, abd pain, N/V/D, lightheadedness/dizziness, numbness/tingling. Pt A&Ox3, IV locked and patent, pt instructed on use of call bell, bed locked and in lowest position. 80 y/o female BIBEMS c/o SOB. Per EMS report "Pt coming from home, lives alone but has 2 aides at home, family called for SOB, pt placed on CPAP en route, became claustrophobic with the mask on, 2 breathing treatments at home, 125 solumedrol and 2 breathing treatments on the way in to [facility], Hx COPD, supposed to be wearing at home but wasn't wearing it upon arrival oxygen saturation 88 on RA, lung sounds wheezy B/L". Pt arrives to [facility] SOB, audible expiratory wheezes, redness and skin breakdown noticed underneath abd/upper perineal area and B/L lower leg edema/erythema present upon arrival which EMS states "Family reports this is bass 20 G IV in L. hand placed by EMS pt became anxious and attempted to stand up and get out of bed due to difficulty breathing "I need to stand up", pt educated on importance of remaining in bed and risk of attempting to get out of bed, pt placed on BIPAP mask w/ RT, RN, MD at bedside. Pt placed on continuous cardiac monitoring and continuous pulse oximetry due to tachycardia and tachypnea/SOB, 20 G IV placed by ER RN. PMHx COPD. Pt denies fever/chills, HA, abd pain, N/V/D, lightheadedness/dizziness, numbness/tingling. Pt A&Ox3, IV locked and patent, pt instructed on use of call bell, bed locked and in lowest position. 80 y/o female BIBEMS c/o SOB. Per EMS report "Pt coming from home, lives alone but has 2 aides at home, family called for SOB, pt placed on CPAP en route, became claustrophobic with the mask on, 2 breathing treatments at home, 125 solumedrol and 2 breathing treatments on the way in to [facility], Hx COPD, supposed to be wearing at home but wasn't wearing it upon arrival oxygen saturation 88 on RA, lung sounds wheezy B/L". Pt arrives to [facility] SOB, audible expiratory wheezes, redness and skin breakdown noticed underneath abd/upper perineal area and B/L lower leg edema/erythema present upon arrival which EMS states "Family reports this is baseline for pt". 20 G IV in L. hand placed by EMS pt became anxious and attempted to stand up and get out of bed due to difficulty breathing "I need to stand up", pt educated on importance of remaining in bed and risk of attempting to get out of bed, pt placed on BIPAP mask w/ RT, RN, MD at bedside. Pt placed on continuous cardiac monitoring and continuous pulse oximetry due to tachycardia and tachypnea/SOB, 20 G IV placed by ER RN. PMHx COPD. Pt denies fever/chills, HA, abd pain, N/V/D, lightheadedness/dizziness, numbness/tingling. Pt A&Ox3, IV locked and patent, pt instructed on use of call bell, bed locked and in lowest position.

## 2024-07-17 ENCOUNTER — TELEPHONE (OUTPATIENT)
Dept: PRIMARY CARE | Facility: CLINIC | Age: 64
End: 2024-07-17
Payer: COMMERCIAL

## 2024-07-17 NOTE — TELEPHONE ENCOUNTER
----- Message from Sanjay Orellana sent at 7/17/2024  7:24 AM EDT -----  Patient has elevated blood sugars and mild proteinuria.  Patient should watch on the sugars in his diet.  Kidney functions are stable.

## 2024-07-18 ENCOUNTER — PHARMACY VISIT (OUTPATIENT)
Dept: PHARMACY | Facility: CLINIC | Age: 64
End: 2024-07-18
Payer: MEDICARE

## 2024-08-12 PROCEDURE — RXMED WILLOW AMBULATORY MEDICATION CHARGE

## 2024-08-15 ENCOUNTER — PHARMACY VISIT (OUTPATIENT)
Dept: PHARMACY | Facility: CLINIC | Age: 64
End: 2024-08-15
Payer: MEDICARE

## 2024-08-19 DIAGNOSIS — E78.00 PURE HYPERCHOLESTEROLEMIA: ICD-10-CM

## 2024-08-20 RX ORDER — ATORVASTATIN CALCIUM 40 MG/1
40 TABLET, FILM COATED ORAL DAILY
Qty: 90 TABLET | Refills: 2 | Status: SHIPPED | OUTPATIENT
Start: 2024-08-20

## 2024-09-18 PROCEDURE — RXMED WILLOW AMBULATORY MEDICATION CHARGE

## 2024-09-19 ENCOUNTER — LAB (OUTPATIENT)
Dept: LAB | Facility: LAB | Age: 64
End: 2024-09-19
Payer: COMMERCIAL

## 2024-09-19 ENCOUNTER — PHARMACY VISIT (OUTPATIENT)
Dept: PHARMACY | Facility: CLINIC | Age: 64
End: 2024-09-19
Payer: MEDICARE

## 2024-09-19 DIAGNOSIS — C61 MALIGNANT NEOPLASM OF PROSTATE (MULTI): Primary | ICD-10-CM

## 2024-09-19 PROCEDURE — 36415 COLL VENOUS BLD VENIPUNCTURE: CPT

## 2024-09-19 PROCEDURE — 84153 ASSAY OF PSA TOTAL: CPT

## 2024-09-20 LAB — PSA SERPL-MCNC: <0.1 NG/ML

## 2024-10-02 DIAGNOSIS — E11.9 TYPE 2 DIABETES MELLITUS WITHOUT COMPLICATION, WITHOUT LONG-TERM CURRENT USE OF INSULIN (MULTI): ICD-10-CM

## 2024-10-02 RX ORDER — PIOGLITAZONEHYDROCHLORIDE 30 MG/1
30 TABLET ORAL DAILY
Qty: 90 TABLET | Refills: 1 | Status: SHIPPED | OUTPATIENT
Start: 2024-10-02

## 2024-10-10 ENCOUNTER — TELEPHONE (OUTPATIENT)
Dept: ENDOCRINOLOGY | Facility: CLINIC | Age: 64
End: 2024-10-10
Payer: COMMERCIAL

## 2024-10-10 NOTE — TELEPHONE ENCOUNTER
LM for Pt to let him know that his appt on 12/20/24 with MP has been cxl, he can call the office to reschedule with another provider.

## 2024-10-17 ENCOUNTER — PHARMACY VISIT (OUTPATIENT)
Dept: PHARMACY | Facility: CLINIC | Age: 64
End: 2024-10-17
Payer: MEDICARE

## 2024-10-17 PROCEDURE — RXMED WILLOW AMBULATORY MEDICATION CHARGE

## 2024-10-18 DIAGNOSIS — E11.9 TYPE 2 DIABETES MELLITUS WITHOUT COMPLICATION, WITHOUT LONG-TERM CURRENT USE OF INSULIN (MULTI): ICD-10-CM

## 2024-10-18 RX ORDER — GLIPIZIDE 10 MG/1
10 TABLET ORAL DAILY
Qty: 90 TABLET | Refills: 1 | Status: SHIPPED | OUTPATIENT
Start: 2024-10-18

## 2024-10-18 RX ORDER — METFORMIN HYDROCHLORIDE 1000 MG/1
1000 TABLET ORAL
Qty: 180 TABLET | Refills: 1 | Status: SHIPPED | OUTPATIENT
Start: 2024-10-18

## 2024-12-20 ENCOUNTER — APPOINTMENT (OUTPATIENT)
Dept: ENDOCRINOLOGY | Facility: CLINIC | Age: 64
End: 2024-12-20
Payer: COMMERCIAL

## 2025-01-18 ENCOUNTER — LAB (OUTPATIENT)
Dept: LAB | Facility: LAB | Age: 65
End: 2025-01-18
Payer: COMMERCIAL

## 2025-01-18 DIAGNOSIS — Z79.4 TYPE 2 DIABETES MELLITUS WITHOUT COMPLICATION, WITH LONG-TERM CURRENT USE OF INSULIN (MULTI): ICD-10-CM

## 2025-01-18 DIAGNOSIS — E78.00 PURE HYPERCHOLESTEROLEMIA: ICD-10-CM

## 2025-01-18 DIAGNOSIS — E11.9 TYPE 2 DIABETES MELLITUS WITHOUT COMPLICATION, WITH LONG-TERM CURRENT USE OF INSULIN (MULTI): ICD-10-CM

## 2025-01-18 DIAGNOSIS — I10 PRIMARY HYPERTENSION: ICD-10-CM

## 2025-01-18 LAB
ALBUMIN SERPL BCP-MCNC: 4.4 G/DL (ref 3.4–5)
ALP SERPL-CCNC: 50 U/L (ref 33–136)
ALT SERPL W P-5'-P-CCNC: 15 U/L (ref 10–52)
ANION GAP SERPL CALCULATED.3IONS-SCNC: 12 MMOL/L (ref 10–20)
AST SERPL W P-5'-P-CCNC: 15 U/L (ref 9–39)
BILIRUB SERPL-MCNC: 0.7 MG/DL (ref 0–1.2)
BUN SERPL-MCNC: 16 MG/DL (ref 6–23)
CALCIUM SERPL-MCNC: 8.9 MG/DL (ref 8.6–10.3)
CHLORIDE SERPL-SCNC: 102 MMOL/L (ref 98–107)
CHOLEST SERPL-MCNC: 152 MG/DL (ref 0–199)
CHOLEST/HDLC SERPL: 2.6 {RATIO}
CO2 SERPL-SCNC: 29 MMOL/L (ref 21–32)
CREAT SERPL-MCNC: 0.97 MG/DL (ref 0.5–1.3)
EGFRCR SERPLBLD CKD-EPI 2021: 88 ML/MIN/1.73M*2
ERYTHROCYTE [DISTWIDTH] IN BLOOD BY AUTOMATED COUNT: 13.8 % (ref 11.5–14.5)
GLUCOSE SERPL-MCNC: 234 MG/DL (ref 74–99)
HCT VFR BLD AUTO: 47.1 % (ref 41–52)
HDLC SERPL-MCNC: 57.7 MG/DL
HGB BLD-MCNC: 15.2 G/DL (ref 13.5–17.5)
LDLC SERPL CALC-MCNC: 76 MG/DL
MCH RBC QN AUTO: 28.7 PG (ref 26–34)
MCHC RBC AUTO-ENTMCNC: 32.3 G/DL (ref 32–36)
MCV RBC AUTO: 89 FL (ref 80–100)
NON HDL CHOLESTEROL: 94 MG/DL (ref 0–149)
NRBC BLD-RTO: 0 /100 WBCS (ref 0–0)
PLATELET # BLD AUTO: 212 X10*3/UL (ref 150–450)
POTASSIUM SERPL-SCNC: 4.7 MMOL/L (ref 3.5–5.3)
PROT SERPL-MCNC: 6.5 G/DL (ref 6.4–8.2)
RBC # BLD AUTO: 5.3 X10*6/UL (ref 4.5–5.9)
SODIUM SERPL-SCNC: 138 MMOL/L (ref 136–145)
TRIGL SERPL-MCNC: 94 MG/DL (ref 0–149)
VLDL: 19 MG/DL (ref 0–40)
WBC # BLD AUTO: 6.9 X10*3/UL (ref 4.4–11.3)

## 2025-01-18 PROCEDURE — 80053 COMPREHEN METABOLIC PANEL: CPT

## 2025-01-18 PROCEDURE — 83036 HEMOGLOBIN GLYCOSYLATED A1C: CPT

## 2025-01-18 PROCEDURE — 80061 LIPID PANEL: CPT

## 2025-01-18 PROCEDURE — 85027 COMPLETE CBC AUTOMATED: CPT

## 2025-01-19 LAB
EST. AVERAGE GLUCOSE BLD GHB EST-MCNC: 183 MG/DL
HBA1C MFR BLD: 8 %

## 2025-01-21 ENCOUNTER — OFFICE VISIT (OUTPATIENT)
Dept: PRIMARY CARE | Facility: CLINIC | Age: 65
End: 2025-01-21
Payer: COMMERCIAL

## 2025-01-21 VITALS
OXYGEN SATURATION: 99 % | TEMPERATURE: 97.2 F | DIASTOLIC BLOOD PRESSURE: 72 MMHG | BODY MASS INDEX: 30.22 KG/M2 | HEIGHT: 73 IN | WEIGHT: 228 LBS | SYSTOLIC BLOOD PRESSURE: 150 MMHG | HEART RATE: 68 BPM

## 2025-01-21 DIAGNOSIS — E11.9 DIABETES MELLITUS, STABLE (MULTI): ICD-10-CM

## 2025-01-21 DIAGNOSIS — I10 PRIMARY HYPERTENSION: Primary | ICD-10-CM

## 2025-01-21 DIAGNOSIS — Z12.5 SCREENING FOR PROSTATE CANCER: ICD-10-CM

## 2025-01-21 PROCEDURE — 3078F DIAST BP <80 MM HG: CPT | Performed by: INTERNAL MEDICINE

## 2025-01-21 PROCEDURE — 3008F BODY MASS INDEX DOCD: CPT | Performed by: INTERNAL MEDICINE

## 2025-01-21 PROCEDURE — 3077F SYST BP >= 140 MM HG: CPT | Performed by: INTERNAL MEDICINE

## 2025-01-21 PROCEDURE — 3048F LDL-C <100 MG/DL: CPT | Performed by: INTERNAL MEDICINE

## 2025-01-21 PROCEDURE — 3052F HG A1C>EQUAL 8.0%<EQUAL 9.0%: CPT | Performed by: INTERNAL MEDICINE

## 2025-01-21 PROCEDURE — 4010F ACE/ARB THERAPY RXD/TAKEN: CPT | Performed by: INTERNAL MEDICINE

## 2025-01-21 PROCEDURE — 99396 PREV VISIT EST AGE 40-64: CPT | Performed by: INTERNAL MEDICINE

## 2025-01-21 PROCEDURE — RXMED WILLOW AMBULATORY MEDICATION CHARGE

## 2025-01-21 RX ORDER — SEMAGLUTIDE 1.34 MG/ML
1 INJECTION, SOLUTION SUBCUTANEOUS
Qty: 3 ML | Refills: 11 | Status: SHIPPED | OUTPATIENT
Start: 2025-01-26 | End: 2025-12-28

## 2025-01-21 RX ORDER — LOSARTAN POTASSIUM 50 MG/1
50 TABLET ORAL DAILY
Qty: 90 TABLET | Refills: 3 | Status: SHIPPED | OUTPATIENT
Start: 2025-01-21 | End: 2026-01-21

## 2025-01-21 ASSESSMENT — PATIENT HEALTH QUESTIONNAIRE - PHQ9
SUM OF ALL RESPONSES TO PHQ9 QUESTIONS 1 AND 2: 0
1. LITTLE INTEREST OR PLEASURE IN DOING THINGS: NOT AT ALL
2. FEELING DOWN, DEPRESSED OR HOPELESS: NOT AT ALL

## 2025-01-21 ASSESSMENT — ENCOUNTER SYMPTOMS
OCCASIONAL FEELINGS OF UNSTEADINESS: 0
DEPRESSION: 0
LOSS OF SENSATION IN FEET: 0

## 2025-01-21 ASSESSMENT — PAIN SCALES - GENERAL: PAINLEVEL_OUTOF10: 0-NO PAIN

## 2025-01-21 NOTE — PROGRESS NOTES
[facility]: [facility]  PROGRESS NOTE      Jasbir Raza is a 64 y.o. male that is being seen  today for Annual Exam.  Subjective   Patient is a 64-year-old male with a history of diabetes, hypertension, hyperlipidemia who is being seen for annual physical examination.  Patient was seen by endocrinologist in the past and was on Ozempic but he stopped going to the endocrinologist and has not felt his Ozempic for few months.  Patient hemoglobin A1c did went up from before.  Patient's blood pressure is also elevated.  Patient is on losartan 25 mg which is being increased.  Denies any issues with the medications.      ROS  Negative for fever or chills  Negative for sore throat, ear pain, nasal discharge  Negative for cough, shortness of breath or wheezing  Negative for chest pain, palpitations, swelling of legs  Negative for abdominal pain, constipation, diarrhea, blood in the stools  Negative for urinary complaints  Negative for headache, dizziness, weakness or numbness  Negative for joint pain  Negative for depression or anxiety  All other systems reviewed and were negative   Vitals:    01/21/25 1424   BP: 150/72   Pulse: 68   Temp: 36.2 °C (97.2 °F)   SpO2: 99%      Vitals:    01/21/25 1424   Weight: 103 kg (228 lb)     Body mass index is 30.08 kg/m².  Physical Exam  Constitutional: Patient does not appear to be in any acute distress  Head and Face: NCAT  Eyes: Normal external exam, EOMI  ENT: Normal external inspection of ears and nose. Oropharynx normal.  Cardiovascular: RRR, S1/S2, no murmurs, rubs, or gallops, radial pulses +2, no edema of extremities  Pulmonary: CTAB, no respiratory distress.  Abdomen: +BS, soft, non-tender, nondistended, no guarding or rebound, no masses noted  MSK: No joint swelling, normal movements of all extremities. Range of motion- normal.  Skin- No lesions, contusions, or erythema.  Peripheral puslses palpable bilaterally 2+  Neuro: AAO X3, Cranial nerves 2-12  grossly intact,DTR 2+ in all 4 limbs   Psychiatric: Judgment intact. Appropriate mood and behavior    LABS   [unfilled]  Lab Results   Component Value Date    GLUCOSE 234 (H) 01/18/2025    CALCIUM 8.9 01/18/2025     01/18/2025    K 4.7 01/18/2025    CO2 29 01/18/2025     01/18/2025    BUN 16 01/18/2025    CREATININE 0.97 01/18/2025     Lab Results   Component Value Date    ALT 15 01/18/2025    AST 15 01/18/2025    ALKPHOS 50 01/18/2025    BILITOT 0.7 01/18/2025     Lab Results   Component Value Date    WBC 6.9 01/18/2025    HGB 15.2 01/18/2025    HCT 47.1 01/18/2025    MCV 89 01/18/2025     01/18/2025     Lab Results   Component Value Date    CHOL 152 01/18/2025    CHOL 157 01/22/2024    CHOL 155 03/03/2023     Lab Results   Component Value Date    HDL 57.7 01/18/2025    HDL 51.0 01/22/2024    HDL 47 03/03/2023     Lab Results   Component Value Date    LDLCALC 76 01/18/2025    LDLCALC 87 01/22/2024    LDLCALC 80 03/03/2023     Lab Results   Component Value Date    TRIG 94 01/18/2025    TRIG 93 01/22/2024    TRIG 139 03/03/2023     Lab Results   Component Value Date    HGBA1C 8.0 (H) 01/18/2025     Other labs not included in the list above were reviewed either before or during this encounter.    History    Past Medical History:   Diagnosis Date    ED (erectile dysfunction)     Hypercholesteremia     Hyperlipidemia     Hypertension     Prostate cancer (Multi)     Proteinuria     Type 2 diabetes mellitus      Past Surgical History:   Procedure Laterality Date    COLONOSCOPY      PROSTATE SURGERY      prostate seeds     Family History   Problem Relation Name Age of Onset    Stroke Father       No Known Allergies  Current Outpatient Medications on File Prior to Visit   Medication Sig Dispense Refill    atorvastatin (Lipitor) 40 mg tablet Take 1 tablet (40 mg) by mouth once daily. 90 tablet 2    glipiZIDE (Glucotrol) 10 mg tablet Take 1 tablet (10 mg) by mouth once daily. 90 tablet 1    metFORMIN  (Glucophage) 1,000 mg tablet Take 1 tablet (1,000 mg) by mouth 2 times daily (morning and late afternoon). 180 tablet 1    OneTouch Ultra Test strip       pioglitazone (Actos) 30 mg tablet Take 1 tablet (30 mg) by mouth once daily. 90 tablet 1    [DISCONTINUED] losartan (Cozaar) 25 mg tablet Take 1 tablet (25 mg) by mouth once daily.      [DISCONTINUED] Ozempic 1 mg/dose (4 mg/3 mL) pen injector Inject 1 mg under the skin 1 (one) time per week. (Patient not taking: Reported on 1/21/2025) 3 mL 11     No current facility-administered medications on file prior to visit.     Immunization History   Administered Date(s) Administered    Flu vaccine, quadrivalent, high-dose, preservative free, age 65y+ (FLUZONE) 09/15/2023    Flu vaccine, quadrivalent, recombinant, preservative free, adult (FLUBLOK) 11/24/2021, 10/03/2022    Flu vaccine, trivalent, preservative free, age 6 months and greater (Fluarix/Fluzone/Flulaval) 12/01/2024    Influenza, injectable, MDCK, quadrivalent 11/27/2020    Influenza, injectable, quadrivalent 10/12/2018, 11/01/2019    Moderna SARS-CoV-2 Vaccination 03/17/2021, 04/14/2021, 11/13/2021    Pfizer COVID-19 vaccine, 12 years and older, (30mcg/0.3mL) (Comirnaty) 09/23/2023    Pneumococcal conjugate vaccine, 20-valent (PREVNAR 20) 09/15/2023    Tdap vaccine, age 7 year and older (BOOSTRIX, ADACEL) 07/16/2024     Patient's medical history was reviewed and updated either before or during this encounter.  ASSESSMENT / PLAN:  Diagnoses and all orders for this visit:  Primary hypertension  -     losartan (Cozaar) 50 mg tablet; Take 1 tablet (50 mg) by mouth once daily.  Diabetes mellitus, stable (Multi)  -     Ozempic 1 mg/dose (4 mg/3 mL) pen injector; Inject 1 mg under the skin 1 (one) time per week.  -     CBC; Future  -     Comprehensive Metabolic Panel; Future  -     Hemoglobin A1C; Future  Screening for prostate cancer  -     Prostate Specific Antigen; Future    Patient is being seen for follow-up  visit.  Patient blood sugars are not very well-controlled.  Patient was on Ozempic but he never followed up with the endocrinologist due to co-pay.  Patient is being restarted on Ozempic and will continue on the other oral hypoglycemics.  Patient's blood pressure is elevated and dose of losartan is being increased to 50 mg daily.  Patient will follow-up in 3 to 4 months.      Sanjay Orellana MD

## 2025-01-22 ENCOUNTER — PHARMACY VISIT (OUTPATIENT)
Dept: PHARMACY | Facility: CLINIC | Age: 65
End: 2025-01-22
Payer: MEDICARE

## 2025-02-12 PROCEDURE — RXMED WILLOW AMBULATORY MEDICATION CHARGE

## 2025-02-15 ENCOUNTER — PHARMACY VISIT (OUTPATIENT)
Dept: PHARMACY | Facility: CLINIC | Age: 65
End: 2025-02-15
Payer: MEDICARE

## 2025-03-14 PROCEDURE — RXMED WILLOW AMBULATORY MEDICATION CHARGE

## 2025-03-17 ENCOUNTER — PHARMACY VISIT (OUTPATIENT)
Dept: PHARMACY | Facility: CLINIC | Age: 65
End: 2025-03-17
Payer: MEDICARE

## 2025-04-16 PROCEDURE — RXMED WILLOW AMBULATORY MEDICATION CHARGE

## 2025-04-21 ENCOUNTER — PHARMACY VISIT (OUTPATIENT)
Dept: PHARMACY | Facility: CLINIC | Age: 65
End: 2025-04-21
Payer: MEDICARE

## 2025-04-28 DIAGNOSIS — E11.9 TYPE 2 DIABETES MELLITUS WITHOUT COMPLICATION, WITHOUT LONG-TERM CURRENT USE OF INSULIN: ICD-10-CM

## 2025-04-28 RX ORDER — METFORMIN HYDROCHLORIDE 1000 MG/1
1000 TABLET ORAL
Qty: 180 TABLET | Refills: 1 | Status: SHIPPED | OUTPATIENT
Start: 2025-04-28

## 2025-04-28 RX ORDER — GLIPIZIDE 10 MG/1
10 TABLET ORAL DAILY
Qty: 90 TABLET | Refills: 1 | Status: SHIPPED | OUTPATIENT
Start: 2025-04-28

## 2025-04-28 RX ORDER — PIOGLITAZONE 30 MG/1
30 TABLET ORAL DAILY
Qty: 90 TABLET | Refills: 1 | Status: SHIPPED | OUTPATIENT
Start: 2025-04-28

## 2025-04-28 NOTE — TELEPHONE ENCOUNTER
LV 1/21/25, [place] 7/22/25    Glipizide 10mg  Pioglitazone 30mg  Metformin 1000mg    [facility]

## 2025-06-07 PROCEDURE — RXMED WILLOW AMBULATORY MEDICATION CHARGE

## 2025-06-12 ENCOUNTER — PHARMACY VISIT (OUTPATIENT)
Dept: PHARMACY | Facility: CLINIC | Age: 65
End: 2025-06-12
Payer: MEDICARE

## 2025-07-05 PROCEDURE — RXMED WILLOW AMBULATORY MEDICATION CHARGE

## 2025-07-07 ENCOUNTER — PHARMACY VISIT (OUTPATIENT)
Dept: PHARMACY | Facility: CLINIC | Age: 65
End: 2025-07-07
Payer: MEDICARE

## 2025-07-18 DIAGNOSIS — E78.00 PURE HYPERCHOLESTEROLEMIA: ICD-10-CM

## 2025-07-19 RX ORDER — ATORVASTATIN CALCIUM 40 MG/1
40 TABLET, FILM COATED ORAL DAILY
Qty: 90 TABLET | Refills: 2 | Status: SHIPPED | OUTPATIENT
Start: 2025-07-19

## 2025-07-22 ENCOUNTER — OFFICE VISIT (OUTPATIENT)
Dept: PRIMARY CARE | Facility: CLINIC | Age: 65
End: 2025-07-22
Payer: COMMERCIAL

## 2025-07-22 VITALS
DIASTOLIC BLOOD PRESSURE: 60 MMHG | BODY MASS INDEX: 28.63 KG/M2 | WEIGHT: 216 LBS | OXYGEN SATURATION: 99 % | SYSTOLIC BLOOD PRESSURE: 120 MMHG | HEART RATE: 89 BPM | TEMPERATURE: 97.2 F | HEIGHT: 73 IN

## 2025-07-22 DIAGNOSIS — I10 PRIMARY HYPERTENSION: Primary | ICD-10-CM

## 2025-07-22 DIAGNOSIS — E78.00 PURE HYPERCHOLESTEROLEMIA: ICD-10-CM

## 2025-07-22 DIAGNOSIS — E11.9 TYPE 2 DIABETES MELLITUS WITHOUT COMPLICATION, WITH LONG-TERM CURRENT USE OF INSULIN: ICD-10-CM

## 2025-07-22 DIAGNOSIS — C61 MALIGNANT TUMOR OF PROSTATE (MULTI): ICD-10-CM

## 2025-07-22 DIAGNOSIS — Z79.4 TYPE 2 DIABETES MELLITUS WITHOUT COMPLICATION, WITH LONG-TERM CURRENT USE OF INSULIN: ICD-10-CM

## 2025-07-22 PROCEDURE — 3078F DIAST BP <80 MM HG: CPT | Performed by: INTERNAL MEDICINE

## 2025-07-22 PROCEDURE — 4010F ACE/ARB THERAPY RXD/TAKEN: CPT | Performed by: INTERNAL MEDICINE

## 2025-07-22 PROCEDURE — 3074F SYST BP LT 130 MM HG: CPT | Performed by: INTERNAL MEDICINE

## 2025-07-22 PROCEDURE — 99214 OFFICE O/P EST MOD 30 MIN: CPT | Performed by: INTERNAL MEDICINE

## 2025-07-22 PROCEDURE — 3008F BODY MASS INDEX DOCD: CPT | Performed by: INTERNAL MEDICINE

## 2025-07-22 RX ORDER — ATORVASTATIN CALCIUM 40 MG/1
40 TABLET, FILM COATED ORAL DAILY
Qty: 90 TABLET | Refills: 2 | Status: SHIPPED | OUTPATIENT
Start: 2025-07-22

## 2025-07-22 ASSESSMENT — PATIENT HEALTH QUESTIONNAIRE - PHQ9
2. FEELING DOWN, DEPRESSED OR HOPELESS: NOT AT ALL
1. LITTLE INTEREST OR PLEASURE IN DOING THINGS: NOT AT ALL
SUM OF ALL RESPONSES TO PHQ9 QUESTIONS 1 AND 2: 0

## 2025-07-22 ASSESSMENT — PAIN SCALES - GENERAL: PAINLEVEL_OUTOF10: 0-NO PAIN

## 2025-07-22 ASSESSMENT — ENCOUNTER SYMPTOMS
OCCASIONAL FEELINGS OF UNSTEADINESS: 0
DEPRESSION: 0
LOSS OF SENSATION IN FEET: 0

## 2025-07-22 NOTE — PROGRESS NOTES
[facility]: [facility]  PROGRESS NOTE      Jasbir Raza is a 65 y.o. male that is being seen  today for Follow-up.  Subjective   Patient is a 65-year-old male with a history of diabetes, hypertension and hyperlipidemia who is being seen for 6-month follow-up.  Patient's blood sugars have been stable.  Patient blood pressure is better controlled after changing medication during last visit.  Patient does follow-up with the urologist for prostate cancer.  Patient is due for repeat blood work done.  Patient is on Ozempic and has been watching his diet and has lost about 12 pounds of weight which is intentional.      ROS  Negative for fever or chills  Negative for sore throat, ear pain, nasal discharge  Negative for cough, shortness of breath or wheezing  Negative for chest pain, palpitations, swelling of legs  Negative for abdominal pain, constipation, diarrhea, blood in the stools  Negative for urinary complaints  Negative for headache, dizziness, weakness or numbness  Negative for joint pain  Negative for depression or anxiety  All other systems reviewed and were negative   Vitals:    07/22/25 1359   BP: 120/60   Pulse: 89   Temp: 36.2 °C (97.2 °F)   SpO2: 99%      Vitals:    07/22/25 1359   Weight: 98 kg (216 lb)     Body mass index is 28.5 kg/m².  Physical Exam  Constitutional: Patient does not appear to be in any acute distress  Head and Face: NCAT  Eyes: Normal external exam, EOMI  ENT: Normal external inspection of ears and nose. Oropharynx normal.  Cardiovascular: RRR, S1/S2, no murmurs, rubs, or gallops, radial pulses +2, no edema of extremities  Pulmonary: CTAB, no respiratory distress.  Abdomen: +BS, soft, non-tender, nondistended, no guarding or rebound, no masses noted  MSK: No joint swelling, normal movements of all extremities. Range of motion- normal.  Skin- No lesions, contusions, or erythema.  Peripheral puslses palpable bilaterally 2+  Neuro: AAO X3, Cranial nerves 2-12  grossly intact,DTR 2+ in all 4 limbs   Psychiatric: Judgment intact. Appropriate mood and behavior    LABS   [unfilled]  Lab Results   Component Value Date    GLUCOSE 234 (H) 01/18/2025    CALCIUM 8.9 01/18/2025     01/18/2025    K 4.7 01/18/2025    CO2 29 01/18/2025     01/18/2025    BUN 16 01/18/2025    CREATININE 0.97 01/18/2025     Lab Results   Component Value Date    ALT 15 01/18/2025    AST 15 01/18/2025    ALKPHOS 50 01/18/2025    BILITOT 0.7 01/18/2025     Lab Results   Component Value Date    WBC 6.9 01/18/2025    HGB 15.2 01/18/2025    HCT 47.1 01/18/2025    MCV 89 01/18/2025     01/18/2025     Lab Results   Component Value Date    CHOL 152 01/18/2025    CHOL 157 01/22/2024    CHOL 155 03/03/2023     Lab Results   Component Value Date    HDL 57.7 01/18/2025    HDL 51.0 01/22/2024    HDL 47 03/03/2023     Lab Results   Component Value Date    LDLCALC 76 01/18/2025    LDLCALC 87 01/22/2024    LDLCALC 80 03/03/2023     Lab Results   Component Value Date    TRIG 94 01/18/2025    TRIG 93 01/22/2024    TRIG 139 03/03/2023     Lab Results   Component Value Date    HGBA1C 8.0 (H) 01/18/2025     Other labs not included in the list above were reviewed either before or during this encounter.    History    Medical History[1]  Surgical History[2]  Family History[3]  Allergies[4]  Medications Ordered Prior to Encounter[5]  Immunization History   Administered Date(s) Administered    Flu vaccine, quadrivalent, high-dose, preservative free, age 65y+ (FLUZONE) 09/15/2023    Flu vaccine, quadrivalent, recombinant, preservative free, adult (FLUBLOK) 11/24/2021, 10/03/2022    Flu vaccine, trivalent, preservative free, age 6 months and greater (Fluarix/Fluzone/Flulaval) 12/01/2024    Influenza, injectable, MDCK, quadrivalent 11/27/2020    Influenza, injectable, quadrivalent 10/12/2018, 11/01/2019    Moderna SARS-CoV-2 Vaccination 03/17/2021, 04/14/2021, 11/13/2021    Pfizer COVID-19 vaccine, 12 years and  older, (30mcg/0.3mL) (Comirnaty) 09/23/2023    Pneumococcal conjugate vaccine, 20-valent (PREVNAR 20) 09/15/2023    Tdap vaccine, age 7 year and older (BOOSTRIX, ADACEL) 07/16/2024     Patient's medical history was reviewed and updated either before or during this encounter.  ASSESSMENT / PLAN:  Diagnoses and all orders for this visit:  Primary hypertension  Pure hypercholesterolemia  -     atorvastatin (Lipitor) 40 mg tablet; Take 1 tablet (40 mg) by mouth once daily.  -     Lipid Panel; Future  Type 2 diabetes mellitus without complication, with long-term current use of insulin  -     Albumin-Creatinine Ratio, Urine Random; Future  -     CBC; Future  -     Comprehensive Metabolic Panel; Future  -     Hemoglobin A1C; Future  Malignant tumor of prostate (Multi)    Patient is being seen for 6-month follow-up.  Patient is due for blood work to be done.  Patient has lost weight and has been watching his diet.  Denies any change in medications.  Patient does follow-up with a urologist.      Sanjay Orellana MD        [1]   Past Medical History:  Diagnosis Date    ED (erectile dysfunction)     Hypercholesteremia     Hyperlipidemia     Hypertension     Prostate cancer (Multi)     Proteinuria     Type 2 diabetes mellitus    [2]   Past Surgical History:  Procedure Laterality Date    COLONOSCOPY      PROSTATE SURGERY      prostate seeds   [3]   Family History  Problem Relation Name Age of Onset    Stroke Father     [4] No Known Allergies  [5]   Current Outpatient Medications on File Prior to Visit   Medication Sig Dispense Refill    glipiZIDE (Glucotrol) 10 mg tablet Take 1 tablet (10 mg) by mouth once daily. 90 tablet 1    losartan (Cozaar) 50 mg tablet Take 1 tablet (50 mg) by mouth once daily. 90 tablet 3    metFORMIN (Glucophage) 1,000 mg tablet Take 1 tablet (1,000 mg) by mouth 2 times daily (morning and late afternoon). 180 tablet 1    OneTouch Ultra Test strip       Ozempic 1 mg/dose (4 mg/3 mL) pen injector Inject  1 mg under the skin 1 (one) time per week. 3 mL 11    pioglitazone (Actos) 30 mg tablet Take 1 tablet (30 mg) by mouth once daily. 90 tablet 1    [DISCONTINUED] atorvastatin (Lipitor) 40 mg tablet TAKE 1 TABLET ONCE DAILY 90 tablet 2    [DISCONTINUED] atorvastatin (Lipitor) 40 mg tablet Take 1 tablet (40 mg) by mouth once daily. 90 tablet 2     No current facility-administered medications on file prior to visit.

## 2025-07-23 ENCOUNTER — RESULTS FOLLOW-UP (OUTPATIENT)
Dept: PRIMARY CARE | Facility: CLINIC | Age: 65
End: 2025-07-23
Payer: COMMERCIAL

## 2025-07-23 LAB
ALBUMIN SERPL-MCNC: 4.6 G/DL (ref 3.6–5.1)
ALP SERPL-CCNC: 49 U/L (ref 35–144)
ALT SERPL-CCNC: 11 U/L (ref 9–46)
ANION GAP SERPL CALCULATED.4IONS-SCNC: 11 MMOL/L (CALC) (ref 7–17)
AST SERPL-CCNC: 15 U/L (ref 10–35)
BILIRUB SERPL-MCNC: 0.5 MG/DL (ref 0.2–1.2)
BUN SERPL-MCNC: 13 MG/DL (ref 7–25)
CALCIUM SERPL-MCNC: 9.3 MG/DL (ref 8.6–10.3)
CHLORIDE SERPL-SCNC: 101 MMOL/L (ref 98–110)
CO2 SERPL-SCNC: 26 MMOL/L (ref 20–32)
CREAT SERPL-MCNC: 0.97 MG/DL (ref 0.7–1.35)
EGFRCR SERPLBLD CKD-EPI 2021: 88 ML/MIN/1.73M2
ERYTHROCYTE [DISTWIDTH] IN BLOOD BY AUTOMATED COUNT: 13.6 % (ref 11–15)
EST. AVERAGE GLUCOSE BLD GHB EST-MCNC: 189 MG/DL
EST. AVERAGE GLUCOSE BLD GHB EST-SCNC: 10.4 MMOL/L
GLUCOSE SERPL-MCNC: 183 MG/DL (ref 65–139)
HBA1C MFR BLD: 8.2 %
HCT VFR BLD AUTO: 45 % (ref 38.5–50)
HGB BLD-MCNC: 14.4 G/DL (ref 13.2–17.1)
MCH RBC QN AUTO: 28.8 PG (ref 27–33)
MCHC RBC AUTO-ENTMCNC: 32 G/DL (ref 32–36)
MCV RBC AUTO: 90 FL (ref 80–100)
PLATELET # BLD AUTO: 234 THOUSAND/UL (ref 140–400)
PMV BLD REES-ECKER: 11.3 FL (ref 7.5–12.5)
POTASSIUM SERPL-SCNC: 4.1 MMOL/L (ref 3.5–5.3)
PROT SERPL-MCNC: 6.8 G/DL (ref 6.1–8.1)
PSA SERPL-MCNC: 0.1 NG/ML
RBC # BLD AUTO: 5 MILLION/UL (ref 4.2–5.8)
SODIUM SERPL-SCNC: 138 MMOL/L (ref 135–146)
WBC # BLD AUTO: 11 THOUSAND/UL (ref 3.8–10.8)

## 2025-07-24 NOTE — TELEPHONE ENCOUNTER
----- Message from Sanjay Orellana sent at 7/23/2025  5:40 PM EDT -----  Patient has elevated hemoglobin A1c and blood sugars.  Mild elevation of the white cell count.  Patient needs to watch his sugars and control carbs in his diet.  ----- Message -----  From: Mahesh Copyright Agent Results In  Sent: 7/23/2025   5:29 AM EDT  To: Sanjay Orellana MD

## 2025-08-20 PROCEDURE — RXMED WILLOW AMBULATORY MEDICATION CHARGE

## 2025-08-25 ENCOUNTER — PHARMACY VISIT (OUTPATIENT)
Dept: PHARMACY | Facility: CLINIC | Age: 65
End: 2025-08-25
Payer: MEDICARE

## 2026-01-22 ENCOUNTER — APPOINTMENT (OUTPATIENT)
Dept: PRIMARY CARE | Facility: CLINIC | Age: 66
End: 2026-01-22
Payer: COMMERCIAL